# Patient Record
Sex: FEMALE | Race: WHITE | Employment: OTHER | URBAN - METROPOLITAN AREA
[De-identification: names, ages, dates, MRNs, and addresses within clinical notes are randomized per-mention and may not be internally consistent; named-entity substitution may affect disease eponyms.]

---

## 2017-02-28 ENCOUNTER — HOSPITAL ENCOUNTER (OUTPATIENT)
Dept: MAMMOGRAPHY | Age: 72
Discharge: HOME | End: 2017-02-28
Attending: INTERNAL MEDICINE
Payer: MEDICARE

## 2017-02-28 DIAGNOSIS — M89.9 BONE DISORDER: ICD-10-CM

## 2017-02-28 PROCEDURE — 77080 DXA BONE DENSITY AXIAL: CPT

## 2018-01-24 PROBLEM — M85.80 OSTEOPENIA DETERMINED BY X-RAY: Status: ACTIVE | Noted: 2018-01-24

## 2018-01-24 PROBLEM — E03.9 ACQUIRED HYPOTHYROIDISM: Status: ACTIVE | Noted: 2018-01-24

## 2018-01-24 PROBLEM — K57.30 DIVERTICULOSIS OF LARGE INTESTINE WITHOUT HEMORRHAGE: Status: ACTIVE | Noted: 2018-01-24

## 2019-01-03 ENCOUNTER — HOSPITAL ENCOUNTER (OUTPATIENT)
Dept: GENERAL RADIOLOGY | Age: 74
Discharge: HOME | End: 2019-01-03
Attending: NURSE PRACTITIONER
Payer: MEDICARE

## 2019-01-03 PROCEDURE — 71046 X-RAY EXAM CHEST 2 VIEWS: CPT

## 2019-04-01 ENCOUNTER — HOSPITAL ENCOUNTER (OUTPATIENT)
Dept: PHYSICAL THERAPY | Age: 74
Discharge: HOME | End: 2019-04-01
Attending: NURSE PRACTITIONER
Payer: MEDICARE

## 2019-04-01 PROCEDURE — 97140 MANUAL THERAPY 1/> REGIONS: CPT

## 2019-04-01 PROCEDURE — 97162 PT EVAL MOD COMPLEX 30 MIN: CPT

## 2019-04-01 PROCEDURE — 97035 APP MDLTY 1+ULTRASOUND EA 15: CPT

## 2019-04-01 NOTE — THERAPY EVALUATION
Johnson Nunu  : 1945  [place]  Secondary: [address] [facility] at [facility]  [address] 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 Domenica Bianchi is a 68 y.o. female presenting to physical therapy with complaints of R neck and shoulder pain with aches into her R arm and tingling into last two fingers in R hand in C8 pathway for one month since lifting a bag of dog food -pain level is 3/10 with mild headaches-history of bulging disk in cervical region per patient  -mild decreased  in R hand -UE range of motion is wfl -cervical range of motion is limited with pain in L  rotation and L sidebending with increased R cervical pain -very rounded shoulders and very tight B upper trapezius with mild rhomboid spasm -noted kyphosis at cervical thoracic junction-tight anterior /chest wall musculature with weak scapular musculature-very good candidate for skilled physical therapy to include manual therapy/cervical traction, pain modalities as needed and therapeutic exercises with proper postural training -motivated patient      PROBLEM LIST (Impacting functional limitations):  1. Decreased Strength  2. Decreased ADL/Functional Activities  3.  Increased Pain INTERVENTIONS PLANNED: (Treatment may consist of any combination of the following)  1. Cold  2. Electrical Stimulation  3. Heat  4. Home Exercise Program (HEP)  5. Manual Therapy  6. Range of Motion (ROM)  7. Therapeutic Exercise/Strengthening  8. Ultrasound (US)   TREATMENT PLAN:  Effective Dates: 4/1/2019 TO 5/17/2019 (45 days). Frequency/Duration: 2 times a week for 45 Days  GOALS: (Goals have been discussed and agreed upon with patient.)  Short-Term Functional Goals: Time Frame: 4/1/2019 to 5/17/19  1. Patient demonstrates independence with home exercise program without verbal cueing provided by therapist.  2. Neck and shoulder/upper humeral  Pain is less than 3/10 to progress to DC goal   3. Cervical range of motion in L rotation and L sidebending is increased to 75%  4. Decreased upper trapezius spasm by 10%  5. Less rounded shoulders via consistent exercise program  Instruction in exercise program to allow increased ADLs. Discharge Goals: Time Frame: 4/1/2019 to 5/17/19  1. Neck and shoulder pain is minimal at 0-1/10 to allow most home ADls including light over head lifting   2. No headaches  3. Cervical range of motion is wfl with minimal to no pain or stiffness  4. Minimal upper trapezius spasm with less rounded posture via consistent exercise program and breaking up forward/static positions   5. DASH score is improved from 27/55 to 14/55    Outcome Measure: Tool Used: Disabilities of the Arm, Shoulder and Hand (DASH) Questionnaire - Quick Version  Score:  Initial: 27/55  Most Recent: X/55 (Date: -- )   Interpretation of Score: The DASH is designed to measure the activities of daily living in person's with upper extremity dysfunction or pain. Each section is scored on a 1-5 scale, 5 representing the greatest disability. The scores of each section are added together for a total score of 55.         Medical Necessity:   · Patient is expected to demonstrate progress in strength, range of motion, balance, coordination and functional technique to increase independence with ADLs and improve safety during driving and over head activities. · Skilled intervention continues to be required due to R cervical and upper arm pain is affecting function. Reason for Services/Other Comments:  · Patient continues to require modification of therapeutic interventions to increase complexity of exercises. Total Evaluation Duration: 25 minutes    Rehabilitation Potential For Stated Goals: Good  Regarding Reeda Marker therapy, I certify that the treatment plan above will be carried out by a therapist or under their direction. Thank you for this referral,  Lela Miller PT     Referring Physician Signature: Didi Jean NP              Date                     PAIN/SUBJECTIVE:    Initial: Pain Intensity 1: 3  Pain Location 1: Neck, Shoulder, Arm  Pain Orientation 1: Upper, Lower  Pain Intervention(s) 1: Heat applied, Rest, Shower  Post Session:  2/10 -less spasm -relief with manual therapy     HISTORY:    History of Injury/Illness (Reason for Referral):  R cervical and upper arm pain with tingling into last two fingers of R hand since 3/1/19 after lifting a bag of dog food  Past Medical History/Comorbidities:   Ms. Lux Robbins  has a past medical history of Endometrial cancer ([facility] [address] Hypothyroidism, and Mitral valve prolapse. Ms. Lux Robbins  has a past surgical history that includes hx hysterectomy (2014) and hx tubal ligation (1982).   Social History/Living Environment:   Home Environment: Private residence  [place] to Enter: Yes  Wheelchair Ramp: No  Support Systems: Spouse/Significant Other/Partner  Patient Expects to be Discharged to[de-identified] Private residence  Current DME Used/Available at Home: None  Tub or Shower Type: Tub/Shower combination  Prior Level of Function/Work/Activity:  Independent with home ADLs including yard work   Dominant Side:         RIGHT    Ambulatory/Rehab Services H2 Model Falls Risk Assessment    Risk Factors:  Click here to Sprint 24 Quan to Rise from Chair:       (1)  Pushes up, successful in one attempt    Falls Prevention Plan:       No modifications necessary    Total: (5 or greater = High Risk): 1    ©2010 [facility] and [facility] Patent #[id number]. Federal Law prohibits the replication, distribution or use without written permission from [facility]    Current Medications:        Current Outpatient Medications:     fluticasone (FLONASE) 50 mcg/actuation nasal spray, 2 Sprays by Both Nostrils route daily. , Disp: 1 Bottle, Rfl: 1    albuterol (PROVENTIL HFA, VENTOLIN HFA, PROAIR HFA) 90 mcg/actuation inhaler, Take 2 Puffs by inhalation every four (4) hours as needed for Wheezing., Disp: 1 Inhaler, Rfl: 1    levoFLOXacin (LEVAQUIN) 500 mg tablet, Take 1 Tab by mouth daily. , Disp: 10 Tab, Rfl: 0    levothyroxine (SYNTHROID) 75 mcg tablet, Take 1 Tab by mouth Daily (before breakfast). BRAND NAME ONLY, Disp: 90 Tab, Rfl: 3    valACYclovir (VALTREX) 1 gram tablet, Take 1 Tab by mouth two (2) times daily as needed. , Disp: 30 Tab, Rfl: 0    varicella-zoster recombinant, PF, (SHINGRIX, PF,) 50 mcg/0.5 mL susr injection, IM: 0.5 mL administered as a 2-dose series at 0 and 2 to 6 months later, Disp: 0.5 mL, Rfl: 1    L. gasseri-B. bifidum-B longum 1.5 billion cell cap, Take  by mouth daily. , Disp: , Rfl:     fexofenadine (ALLEGRA) 180 mg tablet, Take 180 mg by mouth daily as needed for Allergies. , Disp: , Rfl:     Date Last Reviewed:  4/1/2019    Number of Personal Factors/Comorbidities that affect the Plan of Care-patient has a history of hypothyroidism, cancer, and cervical disc issues : 1-2: MODERATE COMPLEXITY    EXAMINATION:    Observation/Orthostatic Postural Assessment:          Noted rounded shoulders with forward head on neck posture and cervico thoracic kyphosis  Palpation:        Extremely tight bilateral upper trapezius but greater on the R side -tight pectoralis major from rounded posture   ROM: UE range of motion is wfl     Cervical motion is limited at 85% flexion/66% L rotation and 66% L sidebending with causing increased R cervical pain     Strength:         UE strength is wfl at 4/5 with  at 23 in dominant R hand and 24 in L hand   Special Tests:         Negative supraspinatus isolation test/negative lift off test for subscapularis/negative drop arm for rotator cuff tear/negative Obriens test for labrum issues -positive cervical distraction sign   Neurological Screen:    Tingling in last two fingers of R hand in C8 pathway     Mental Status:        Alert and oriented  Sensation:       Intact to light touch but tingling in ring finger and indgiiti minimi of R hand    Body Structures Involved:  1. Nerves  2. Bones  3. Joints  4. Muscles Body Functions Affected:  1. Sensory/Pain  2. Neuromusculoskeletal  3. Movement Related Activities and Participation Affected:  1. Learning and Applying Knowledge  2. General Tasks and Demands  3. Mobility  4. Self Care  5.  Domestic Life    Number of elements (examined above) that affect the Plan of Care: 3: MODERATE COMPLEXITY    CLINICAL PRESENTATION:    Presentation: Evolving clinical presentation with changing clinical characteristics: MODERATE COMPLEXITY    CLINICAL DECISION MAKING:    Use of outcome tool(s) and clinical judgement create a POC that gives a-outcome measure score illustrates up to 39% impairment: Questionable prediction of patient's progress: MODERATE COMPLEXITY

## 2019-04-01 NOTE — PROGRESS NOTES
Benjamin Epps  : 1945  [place]  Secondary: [facility]  [address] [place] [address], [place], [address] [place] [address]  [phone]   [phone]      OUTPATIENT PHYSICAL THERAPY: Daily Treatment Note 2019    Pre-treatment Symptoms/Complaints:  My R neck and shoulder have bothered me for a month after I lifted a bag of dog food    Pain: Initial: Pain Intensity 1: 3  Pain Location 1: Neck, Shoulder, Arm  Pain Orientation 1: Upper, Lower  Pain Intervention(s) 1: Heat applied, Rest, Shower  Post Session:  2/10-relief with manual therapy/traction -very tight bilateral upper trapezius with rounded posture    Medications Last Reviewed:  2019  REFERRING PHYSICIAN: Ginette Blackburn NP  RETURN PHYSICIAN APPOINTMENT: unsure    Precautions: overhead lifting and static forward/rounded cervical positions    Date of Onset: 3-1-19    Updated Objective Findings:  See evaluation note from today   TREATMENT:   THERAPEUTIC EXERCISE: (5 minutes):    Reviewed in exercises and hot/cold modalities      Exercises per grid below to improve mobility, strength, balance and coordination. Required minimal verbal cues to promote proper body alignment and promote proper body posture. Progressed resistance, range, repetitions and complexity of movement as indicated.      Date:  2019 Date:   Date:     Activity/Exercise Parameters Parameters Parameters   Cervical stretch      Rhomboid stretch      Pectoralis major/corner stretch      Backward shoulder shrugs      rows      Low rows      Scapular retractions          MANUAL THERAPY: (25 minutes): Joint mobilization and Soft tissue mobilization was utilized and necessary because of the patient's restricted joint motion and painful spasm   Soft tissue mobilization x 10 minutes to B upper trapezius /emphasizing R upper trapezius and R cervical paraspinals while seated-effleurage and petrisage for tightness   Manual cervical traction with mild mobilization and occipital release x 15 minutes while supine     MODALITIES: (10 minutes): *  Ultrasound was used today secondary to the patient having tightened structures limiting joint motion that require an increase in extensibility. Ultrasound was used today to reduce pain, reduce spasm, reduce joint stiffness and increase muscle flexibility. HEP: As above; handouts given to patient for all exercises. Treatment/Session Summary:    · Response to Treatment:  relief with manual therapy . · Communication/Consultation:  None today and push more extended posture -break up static computer/driving positions  · Equipment provided today:  None today and red theraband for home usage   · Recommendations/Intent for next treatment session: Next visit will focus on manual therapy to very tight B upper trapezius and cervical traction-stretch anterior/chest wall and strengthen scapular musculature .   Treatment Plan of Care Effective Dates: 4/1/2019 to 5/17/19    Total Treatment Billable Duration:  Manual therapy x 25 minutes/ultrasound x 10 minutes/evaluation x 25 minutes -total time was 60 minutes     Exercise review x 5 minutes       PT Patient Time In/Time Out  Time In: 1073  Time Out: 1640  Chava Valverde, PT

## 2019-04-03 ENCOUNTER — APPOINTMENT (OUTPATIENT)
Dept: PHYSICAL THERAPY | Age: 74
End: 2019-04-03

## 2019-04-05 ENCOUNTER — HOSPITAL ENCOUNTER (OUTPATIENT)
Dept: PHYSICAL THERAPY | Age: 74
Discharge: HOME | End: 2019-04-05
Attending: NURSE PRACTITIONER
Payer: MEDICARE

## 2019-04-05 PROCEDURE — 97035 APP MDLTY 1+ULTRASOUND EA 15: CPT

## 2019-04-05 PROCEDURE — 97140 MANUAL THERAPY 1/> REGIONS: CPT

## 2019-04-05 PROCEDURE — 97110 THERAPEUTIC EXERCISES: CPT

## 2019-04-05 NOTE — PROGRESS NOTES
Zelalem Mcdowell  : 1945  [place]  Secondary: [facility] at [address], [address]  [phone]   [phone]      OUTPATIENT PHYSICAL THERAPY: Daily Treatment Note 2019    Pre-treatment Symptoms/Complaints:  Patient reports feeling better after last session. C/O intermittent radiating pain into R UE and headache but overall some improvement. Pain: Initial: Pain Intensity 1: 3  Pain Location 1: Neck  Pain Orientation 1: Upper, Lower  Post Session: 1/10-    Medications Last Reviewed:  2019  REFERRING PHYSICIAN: Shayan Trinidad NP  RETURN PHYSICIAN APPOINTMENT: unsure    Precautions: overhead lifting and static forward/rounded cervical positions    Date of Onset: 3-1-19    Updated Objective Findings:  See evaluation note from today   TREATMENT:   THERAPEUTIC EXERCISE: (15 minutes):    Reviewed in exercises and hot/cold modalities      Exercises per grid below to improve mobility, strength, balance and coordination. Required minimal verbal cues to promote proper body alignment and promote proper body posture. Progressed resistance, range, repetitions and complexity of movement as indicated. Date:  2019 Date:  19 Date:     Activity/Exercise Parameters Parameters Parameters   Cervical stretch  X 3    Rhomboid stretch  X 3    Pectoralis major/corner stretch  X 2    Backward shoulder shrugs      rows  Green 1 x 10    Low rows  Red 1 x 10    Scapular retractions  1 x 10        MANUAL THERAPY: (25 minutes): Joint mobilization and Soft tissue mobilization was utilized and necessary because of the patient's restricted joint motion and painful spasm   Soft tissue mobilization to B upper trapezius /emphasizing R upper trapezius and R cervical paraspinals while seated-effleurage,acupressure and petrissage for tightness        MODALITIES: (15 minutes):       *  Ultrasound was used today secondary to the patient having tightened structures limiting joint motion that require an increase in extensibility. Ultrasound was used today to reduce pain, reduce spasm, reduce joint stiffness and increase muscle flexibility. 1.3 duffy/cm2 in sitting    HEP: As above; handouts given to patient for all exercises. Treatment/Session Summary:    · Response to Treatment:  relief with manual therapy . · Communication/Consultation:  extensive education in exercise technique  · Equipment provided today:  None today  · Recommendations/Intent for next treatment session: Next visit will focus on manual therapy to very tight B upper trapezius and cervical traction-stretch anterior/chest wall and strengthen scapular musculature .   Treatment Plan of Care Effective Dates: 4/1/2019 to 5/17/19    Total Treatment Billable Duration:  55 minutes      PT Patient Time In/Time Out  Time In: 1430  Time Out: 1530  Guerrero Xie PTA

## 2019-04-08 ENCOUNTER — HOSPITAL ENCOUNTER (OUTPATIENT)
Dept: PHYSICAL THERAPY | Age: 74
Discharge: HOME | End: 2019-04-08
Attending: NURSE PRACTITIONER
Payer: MEDICARE

## 2019-04-08 PROCEDURE — 97140 MANUAL THERAPY 1/> REGIONS: CPT

## 2019-04-08 PROCEDURE — 97110 THERAPEUTIC EXERCISES: CPT

## 2019-04-08 PROCEDURE — 97035 APP MDLTY 1+ULTRASOUND EA 15: CPT

## 2019-04-08 NOTE — PROGRESS NOTES
Addy Mohs  : 1945  [place]  [facility]: [facility]  [address] [place] [address], [place], [address] [place] [address]  [phone]   [phone]      OUTPATIENT PHYSICAL THERAPY: Daily Treatment Note 2019    Pre-treatment Symptoms/Complaints:  Patient reports feeling better an dis trying to watch her posture -no numbness/tingling into fingers today    Pain: Initial: Pain Intensity 1: 2  Pain Location 1: Neck  Pain Orientation 1: Lower, Right  Pain Intervention(s) 1: Cold pack, Exercise, Heat applied  Post Session: 1/10-    Medications Last Reviewed:  2019  REFERRING PHYSICIAN: Nate Brandon NP  RETURN PHYSICIAN APPOINTMENT: unsure    Precautions: overhead lifting and static forward/rounded cervical positions    Date of Onset: 3-1-19    Updated Objective Findings: decreased cervical spasm -more erect posture-decreased radicular symptoms into R hand   TREATMENT:   THERAPEUTIC EXERCISE: (20 minutes):    Reviewed in exercises and hot/cold modalities      Exercises per grid below to improve mobility, strength, balance and coordination. Required minimal verbal cues to promote proper body alignment and promote proper body posture. Progressed resistance, range, repetitions and complexity of movement as indicated.      Date:  2019 Date:  19 Date:  19   Activity/Exercise Parameters Parameters Parameters   Cervical stretch  X 3 4 x 30 seconds   Rhomboid stretch  X 3 2 x 30 seconds   Pectoralis major/corner stretch  X 2 2 x 30 seconds   Backward shoulder shrugs   2 x 10 with red band   rows  Green 1 x 10 2 x 10 with green band   Low rows  Red 1 x 10 2 x 10 with green band    Reverse wall pushups   2 x 10   Scapular retractions  1 x 10 2 x 10 with green band       MANUAL THERAPY: (25 minutes): Joint mobilization and Soft tissue mobilization was utilized and necessary because of the patient's restricted joint motion and painful spasm   Soft tissue mobilization to B upper trapezius /emphasizing R upper trapezius and R cervical paraspinals while seated-effleurage,acupressure and petrissage for tightness    manual cervical traction with occipital release while supine     MODALITIES: (10 minutes): *  Ultrasound was used today secondary to the patient having tightened structures limiting joint motion that require an increase in extensibility. Ultrasound was used today to reduce pain, reduce spasm, reduce joint stiffness and increase muscle flexibility. 1.5 duffy/cm2 in sitting to B cervicals and upper  trapezius    HEP: As above; handouts given to patient for all exercises. Treatment/Session Summary:    · Response to Treatment:  relief with manual therapy .-less pain and less rounded posture  · Communication/Consultation:  extensive education in exercise technique  · Equipment provided today:  None today  · Recommendations/Intent for next treatment session: Next visit will focus on manual therapy to very tight B upper trapezius and cervical traction-stretch anterior/chest wall and strengthen scapular musculature and push more extended posture .   Treatment Plan of Care Effective Dates: 4/1/2019 to 5/17/19    Total Treatment Billable Duration:  55 minutes      PT Patient Time In/Time Out  Time In: 1430  Time Out: 215 [facility] PT

## 2019-04-11 ENCOUNTER — HOSPITAL ENCOUNTER (OUTPATIENT)
Dept: PHYSICAL THERAPY | Age: 74
Discharge: HOME | End: 2019-04-11
Attending: NURSE PRACTITIONER
Payer: MEDICARE

## 2019-04-11 PROCEDURE — 97110 THERAPEUTIC EXERCISES: CPT

## 2019-04-11 PROCEDURE — 97140 MANUAL THERAPY 1/> REGIONS: CPT

## 2019-04-11 PROCEDURE — 97035 APP MDLTY 1+ULTRASOUND EA 15: CPT

## 2019-04-11 NOTE — PROGRESS NOTES
Marshall Ferreira  : 1945  [place]  Secondary: [facility]  [address] [place] [address], [place], [address] [place] [address]  [phone]   [phone]      OUTPATIENT PHYSICAL THERAPY: Daily Treatment Note 2019    Pre-treatment Symptoms/Complaints:  Patient reports intermittent radiating pain into right upper extremity but overall improving since starting therapy    Pain: Initial: Pain Intensity 1: 4  Pain Location 1: Neck  Pain Orientation 1: Right  Post Session: 1/10-    Medications Last Reviewed:  2019  REFERRING PHYSICIAN: Montserrat Pruitt NP  RETURN PHYSICIAN APPOINTMENT: unsure    Precautions: overhead lifting and static forward/rounded cervical positions    Date of Onset: 3-1-19    Updated Objective Findings: decreased cervical spasm -more erect posture-decreased radicular symptoms into R hand   TREATMENT:   THERAPEUTIC EXERCISE: (15 minutes):    Reviewed in exercises and hot/cold modalities      Exercises per grid below to improve mobility, strength, balance and coordination. Required minimal verbal cues to promote proper body alignment and promote proper body posture. Progressed resistance, range, repetitions and complexity of movement as indicated.      Date:  2019 Date:  19 Date:  19   Activity/Exercise Parameters Parameters Parameters   Cervical stretch reviewed X 3 4 x 30 seconds   Rhomboid stretch  X 3 2 x 30 seconds   Pectoralis major/corner stretch 4 x 20 sec X 2 2 x 30 seconds   Backward shoulder shrugs   2 x 10 with red band   rows Red 2 x 10 Green 1 x 10 2 x 10 with green band   Low rows Red 2 x 10 Red 1 x 10 2 x 10 with green band    Reverse wall pushups   2 x 10   Scapular retractions 1 x 10 1 x 10 2 x 10 with green band       MANUAL THERAPY: (30 minutes): Joint mobilization and Soft tissue mobilization was utilized and necessary because of the patient's restricted joint motion and painful spasm   Soft tissue mobilization to B upper trapezius /emphasizing R upper trapezius and R cervical paraspinals while seated-effleurage,acupressure and petrissage for tightness    manual cervical traction with occipital release while supine     MODALITIES: (10 minutes): *  Ultrasound was used today secondary to the patient having tightened structures limiting joint motion that require an increase in extensibility. Ultrasound was used today to reduce pain, reduce spasm, reduce joint stiffness and increase muscle flexibility. 1.5 duffy/cm2 in sitting to B cervicals and upper  trapezius    HEP: As above; handouts given to patient for all exercises. Treatment/Session Summary:    · Response to Treatment:  Decreased pain after session. good relief with traction. -  · Communication/Consultation:  None today  · Equipment provided today:  None today  · Recommendations/Intent for next treatment session: Next visit will focus on manual therapy and there ex .   Treatment Plan of Care Effective Dates: 4/1/2019 to 5/17/19    Total Treatment Billable Duration:  55 minutes      PT Patient Time In/Time Out  Time In: 1100  Time Out: 1200  Carlyn Meigs, PTA

## 2019-04-15 ENCOUNTER — HOSPITAL ENCOUNTER (OUTPATIENT)
Dept: PHYSICAL THERAPY | Age: 74
Discharge: HOME | End: 2019-04-15
Attending: NURSE PRACTITIONER
Payer: MEDICARE

## 2019-04-15 PROCEDURE — 97110 THERAPEUTIC EXERCISES: CPT

## 2019-04-15 PROCEDURE — 97035 APP MDLTY 1+ULTRASOUND EA 15: CPT

## 2019-04-15 PROCEDURE — 97140 MANUAL THERAPY 1/> REGIONS: CPT

## 2019-04-15 NOTE — PROGRESS NOTES
Dewight Cushing  : 1945  [place]  Secondary: [facility] at [address], [address] [place] [address]  [phone]   [phone]      OUTPATIENT PHYSICAL THERAPY: Daily Treatment Note 4/15/2019    Pre-treatment Symptoms/Complaints:  Patient reports no radicular symptoms into R hand but main ache is in anterior R shoulder -\"I think the cool wet weather that came in over the weekend may have caused me to be sore or it may be me holding my cat on that shoulder \"    Pain: Initial: Pain Intensity 1: 3  Pain Location 1: Neck, Shoulder  Pain Orientation 1: Anterior, Right  Pain Intervention(s) 1: Cold pack, Exercise, Heat applied, Massage  Post Session: 1/10- mild tightness in R upper trapezius -full pain free R shoulder motion   Medications Last Reviewed:  4/15/2019  REFERRING PHYSICIAN: Jaiden Sanz NP  RETURN PHYSICIAN APPOINTMENT: unsure    Precautions: overhead lifting and static forward/rounded cervical positions    Date of Onset: 3-1-19    Updated Objective Findings: decreased cervical spasm -more erect posture-decreased radicular symptoms into R hand-sore in anterior R shoulder but full/pain free mobility    TREATMENT:   THERAPEUTIC EXERCISE: (18 minutes):    Reviewed in exercises and hot/cold modalities      Exercises per grid below to improve mobility, strength, balance and coordination. Required minimal verbal cues to promote proper body alignment and promote proper body posture. Progressed resistance, range, repetitions and complexity of movement as indicated.      Date:  2019 Date:  4-15-19 Date:  19   Activity/Exercise Parameters Parameters Parameters   Cervical stretch reviewed 3 x 30 seconds on right  4 x 30 seconds   Rhomboid stretch  2 x 30 seconds on right  2 x 30 seconds   Pectoralis major/corner stretch 4 x 20 sec 4 x 30 seconds with slow release  2 x 30 seconds   Backward shoulder shrugs  2 x 10 with green band 2 x 10 with red band   rows Red 2 x 10 2 x 10 with green band 2 x 10 with green band   Low rows Red 2 x 10 2 x 10 with green band 2 x 10 with green band    Reverse wall pushups  2 x 10 2 x 10   Scapular retractions 1 x 10 2 x 10 with green band 2 x 10 with green band       MANUAL THERAPY: (30 minutes): Joint mobilization and Soft tissue mobilization was utilized and necessary because of the patient's restricted joint motion and painful spasm   Soft tissue mobilization to B upper trapezius /emphasizing R upper trapezius and R cervical paraspinals while seated-effleurage,acupressure and petrissage for tightness    manual cervical traction with occipital release while supine     MODALITIES: (10 minutes): *  Ultrasound was used today secondary to the patient having tightened structures limiting joint motion that require an increase in extensibility. Ultrasound was used today to reduce pain, reduce spasm, reduce joint stiffness and increase muscle flexibility. 1.5 duffy/cm2 in sitting to R cervicals and upper  trapezius    Cold pack x 8 minutes to cervicals and R deltoid while supine after therapy     HEP: As above; handouts given to patient for all exercises. Treatment/Session Summary:    · Response to Treatment:  Decreased pain after session. good relief with traction.-may extend ultrasound to R deltoid   · Communication/Consultation:  None today  · Equipment provided today:  None today  · Recommendations/Intent for next treatment session: Next visit will focus on manual therapy and there ex  And more extended posture.   Treatment Plan of Care Effective Dates: 4/1/2019 to 5/17/19    Total Treatment Billable Duration:  66 minutes      PT Patient Time In/Time Out  Time In: 1430  Time Out: 1538  Jessy Platt PT

## 2019-04-19 ENCOUNTER — HOSPITAL ENCOUNTER (OUTPATIENT)
Dept: PHYSICAL THERAPY | Age: 74
Discharge: HOME | End: 2019-04-19
Attending: NURSE PRACTITIONER
Payer: MEDICARE

## 2019-04-19 PROCEDURE — 97035 APP MDLTY 1+ULTRASOUND EA 15: CPT

## 2019-04-19 PROCEDURE — 97110 THERAPEUTIC EXERCISES: CPT

## 2019-04-19 PROCEDURE — 97140 MANUAL THERAPY 1/> REGIONS: CPT

## 2019-04-19 NOTE — PROGRESS NOTES
Boubacar Schuster  : 1945  [place]  Secondary: [facility]  [address] [place] [address], [place], [address] [place] [address]  [phone]   [phone]      OUTPATIENT PHYSICAL THERAPY: Daily Treatment Note 2019    Pre-treatment Symptoms/Complaints:  Patient reports arm and shoulder pain is her main C/O today. Continues to wake up during the night with right arm pain. Feels better after session. Pain: Initial: Pain Intensity 1: 3  Pain Location 1: Neck, Shoulder, Arm  Pain Orientation 1: Right  Post Session: 1/10-   Medications Last Reviewed:  2019  REFERRING PHYSICIAN: Trenton Lowery NP  RETURN PHYSICIAN APPOINTMENT: unsure    Precautions: overhead lifting and static forward/rounded cervical positions    Date of Onset: 3-1-19    Updated Objective Findings: Increased pain and tenderness teres and lateral arm. TREATMENT:   THERAPEUTIC EXERCISE: (10 minutes):      Exercises per grid below to improve mobility, strength, balance and coordination. Required minimal verbal cues to promote proper body alignment and promote proper body posture. Progressed resistance, range, repetitions and complexity of movement as indicated.      Date:  2019 Date:  4-15-19 Date:  19   Activity/Exercise Parameters Parameters Parameters   Cervical stretch reviewed 3 x 30 seconds on right  3 x 30 seconds   Rhomboid stretch  2 x 30 seconds on right     Pectoralis major/corner stretch 4 x 20 sec 4 x 30 seconds with slow release  5 x 10 sec   Backward shoulder shrugs  2 x 10 with green band    rows Red 2 x 10 2 x 10 with green band Red 3 x 10   Low rows Red 2 x 10 2 x 10 with green band Yellow 3 x 10   Reverse wall pushups  2 x 10    Scapular retractions 1 x 10 2 x 10 with green band Multiple reps       MANUAL THERAPY: (35 minutes): Joint mobilization and Soft tissue mobilization was utilized and necessary because of the patient's restricted joint motion and painful spasm   Soft tissue mobilization to B upper trapezius /emphasizing R upper trapezius teres and R cervical paraspinals while seated-effleurage,acupressure and petrissage for tightness    manual cervical traction with occipital release while supine     MODALITIES: (10 minutes): *  Ultrasound was used today secondary to the patient having tightened structures limiting joint motion that require an increase in extensibility. Ultrasound was used today to reduce pain, reduce spasm, reduce joint stiffness and increase muscle flexibility. 1.5 duffy/cm2 in sitting to R cervicals and upper  trapezius      HEP: As above; handouts given to patient for all exercises. Treatment/Session Summary:    · Response to Treatment:  Decreased pain after session. .-  · Communication/Consultation:  educated ti proper scapular positioning and posture. · Equipment provided today:  None today  · Recommendations/Intent for next treatment session: Next visit will focus on manual therapy and there ex .   Treatment Plan of Care Effective Dates: 4/1/2019 to 5/17/19    Total Treatment Billable Duration:  55 minutes      PT Patient Time In/Time Out  Time In: 1340  Time Out: Ingr[address] 22, PTA

## 2019-04-22 ENCOUNTER — HOSPITAL ENCOUNTER (OUTPATIENT)
Dept: PHYSICAL THERAPY | Age: 74
Discharge: HOME | End: 2019-04-22
Attending: NURSE PRACTITIONER
Payer: MEDICARE

## 2019-04-22 PROCEDURE — 97140 MANUAL THERAPY 1/> REGIONS: CPT

## 2019-04-22 PROCEDURE — 97110 THERAPEUTIC EXERCISES: CPT

## 2019-04-22 PROCEDURE — 97035 APP MDLTY 1+ULTRASOUND EA 15: CPT

## 2019-04-22 NOTE — PROGRESS NOTES
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 Ramila Blair is a 68 y.o. female presenting to physical therapy with complaints of R neck and shoulder pain with aches into her R arm and tingling into last two fingers in R hand in C8 pathway for one month since lifting a bag of dog food -pain level is 3/10 with mild headaches-history of bulging disk in cervical region per patient  -mild decreased  in R hand -UE range of motion is wfl -cervical range of motion is limited with pain in L  rotation and L sidebending with increased R cervical pain -very rounded shoulders and very tight B upper trapezius with mild rhomboid spasm -noted kyphosis at cervical thoracic junction-tight anterior /chest wall musculature with weak scapular musculature-very good candidate for skilled physical therapy to include manual therapy/cervical traction, pain modalities as needed and therapeutic exercises with proper postural training -motivated patient     Patient has been seen for 7 visits of R cervical and R shoulder rehab from 19 to 19 with  good progress-cervical pain is minimal at 1/10 with minimal to no headaches -cervical motion has improved to wfl-less rounded posture with less forward head on neck posture -shoulder range of motion is wfl but increased shoulder pain with Obriens test for R labrum issues -UE range of motion is wfl -independent with home exercise program to neck and we have added R shoulder conditioning program as well on 4-22-19-NDI score has improved from 27/55 to 9/55-motivated patient -pleasant lady to work with-continue cervical and shoulder rehab      PROBLEM LIST (Impacting functional limitations):  1. Decreased Strength  2. Decreased ADL/Functional Activities  3. Increased Pain INTERVENTIONS PLANNED: (Treatment may consist of any combination of the following)  1. Cold  2. Electrical Stimulation  3. Heat  4. Home Exercise Program (HEP)  5. Manual Therapy  6. Range of Motion (ROM)  7. Therapeutic Exercise/Strengthening  8. Ultrasound (US)   TREATMENT PLAN:  Effective Dates: 4/1/2019 TO 5/17/2019 (45 days). Frequency/Duration: 2 times a week for 45 Days  GOALS: (Goals have been discussed and agreed upon with patient.)  Short-Term Functional Goals: Time Frame: 4/1/2019 to 5/17/19  1. Patient demonstrates independence with home exercise program without verbal cueing provided by therapist.-GOAL MET  2. Neck and shoulder/upper humeral  Pain is less than 3/10 to progress to DC goal -GOAL MET-LATEST SCORE IS 2/10  3. Cervical range of motion in L rotation and L sidebending is increased to 75%-GOAL MET  4. Decreased upper trapezius spasm by 10%-GOAL MET  5. Less rounded shoulders via consistent exercise program-GOAL MET  Instruction in exercise program to allow increased ADLs. -GOAL MET  Discharge Goals: Time Frame: 4/1/2019 to 5/17/19  1. Neck and shoulder pain is minimal at 0-1/10 to allow most home ADls including light over head lifting -ONGOING  2. No headaches-MINIMAL SINUS HEADCHES  3. Cervical range of motion is wfl with minimal to no pain or stiffness-ONGOING  4.  Minimal upper trapezius spasm with less rounded posture via consistent exercise program and breaking up forward/static positions -ONGOING  5. DASH score is improved from 27/55 to 14/55-GOAL MET-LATEST SCORE IS 9/55    Outcome Measure: Tool Used: Disabilities of the Arm, Shoulder and Hand (DASH) Questionnaire - Quick Version  Score:  Initial: 27/55  Most Recent: 9/55 (Date:4-22-19 -- )   Interpretation of Score: The DASH is designed to measure the activities of daily living in person's with upper extremity dysfunction or pain. Each section is scored on a 1-5 scale, 5 representing the greatest disability. The scores of each section are added together for a total score of 55. Medical Necessity:   · Patient is expected to demonstrate progress in strength, range of motion, balance, coordination and functional technique to increase independence with ADLs and improve safety during driving and over head activities. · Skilled intervention continues to be required due to R cervical and upper arm pain is affecting function. Reason for Services/Other Comments:  · Patient continues to require modification of therapeutic interventions to increase complexity of exercises. Total Evaluation Duration: 25 minutes    Rehabilitation Potential For Stated Goals: Good  Regarding Salma Salamanca therapy, I certify that the treatment plan above will be carried out by a therapist or under their direction.   Thank you for this referral,  Elissa Bustamante PT     Referring Physician Signature: Feliciano Whittington NP              Date                     PAIN/SUBJECTIVE:    Initial: Pain Intensity 1: 3  Pain Location 1: Shoulder  Pain Orientation 1: Lateral, Upper, Right  Pain Intervention(s) 1: Cold pack, Exercise, Heat applied, Massage  Post Session:  2/10 -less spasm -relief with manual therapy     HISTORY:    History of Injury/Illness (Reason for Referral):  R cervical and upper arm pain with tingling into last two fingers of R hand since 3/1/19 after lifting a bag of dog food  Past Medical History/Comorbidities:   Ms. Collins Frias  has a past medical history of Endometrial cancer ([address] Hypothyroidism, and Mitral valve prolapse. Ms. Collins Frias  has a past surgical history that includes hx hysterectomy (2014) and hx tubal ligation (1982). Social History/Living Environment:      Prior Level of Function/Work/Activity:  Independent with home ADLs including yard work   Dominant Side:         RIGHT    Ambulatory/Rehab [facility] Risk Assessment    Risk Factors:  Click here to [employer] Ability to Rise from Chair:       (1)  Pushes up, successful in one attempt    [address]:       No modifications necessary    Total: (5 or greater = High Risk): 1    ©2010 [employer] [facility] [employer] [address] [place] [address] States Patent #[id number]. Federal Law prohibits the replication, distribution or use without written permission from [facility] [employer]    Current Medications:        Current Outpatient Medications:     fluticasone (FLONASE) 50 mcg/actuation nasal spray, 2 Sprays by Both Nostrils route daily. , Disp: 1 Bottle, Rfl: 1    albuterol (PROVENTIL HFA, VENTOLIN HFA, PROAIR HFA) 90 mcg/actuation inhaler, Take 2 Puffs by inhalation every four (4) hours as needed for Wheezing., Disp: 1 Inhaler, Rfl: 1    levoFLOXacin (LEVAQUIN) 500 mg tablet, Take 1 Tab by mouth daily. , Disp: 10 Tab, Rfl: 0    levothyroxine (SYNTHROID) 75 mcg tablet, Take 1 Tab by mouth Daily (before breakfast). BRAND NAME ONLY, Disp: 90 Tab, Rfl: 3    valACYclovir (VALTREX) 1 gram tablet, Take 1 Tab by mouth two (2) times daily as needed. , Disp: 30 Tab, Rfl: 0    varicella-zoster recombinant, PF, (SHINGRIX, PF,) 50 mcg/0.5 mL susr injection, IM: 0.5 mL administered as a 2-dose series at 0 and 2 to 6 months later, Disp: 0.5 mL, Rfl: 1    L. gasseri-B. bifidum-B longum 1.5 billion cell cap, Take  by mouth daily. , Disp: , Rfl:     fexofenadine (ALLEGRA) 180 mg tablet, Take 180 mg by mouth daily as needed for Allergies. , Disp: , Rfl:     Date Last Reviewed:  4/22/2019    Number of Personal Factors/Comorbidities that affect the Plan of Care-patient has a history of hypothyroidism, cancer, and cervical disc issues : 1-2: MODERATE COMPLEXITY    EXAMINATION:    Observation/Orthostatic Postural Assessment:          Noted rounded shoulders with forward head on neck posture and cervico thoracic kyphosis  Palpation:       Less tight bilateral upper trapezius but greater on the R side -less tight pectoralis major from rounded posture via stretches   ROM:          UE range of motion is wfl     Cervical motion is limited at 90% flexion/75% L rotation and 75% L sidebending with less stretching pain      Strength:         UE strength is wfl at 4/5 with  at 23 in dominant R hand and 24 in L hand   Special Tests:         Negative supraspinatus isolation test/negative lift off test for subscapularis/negative drop arm for rotator cuff tear/    Positive negative Obriens test for labrum issues -    positive cervical distraction sign   Neurological Screen:    Tingling in last two fingers of R hand in C8 pathway have resolved     Mental Status:        Alert and oriented  Sensation:       Intact to light touch but tingling in ring finger and indgiiti minimi of R hand    Body Structures Involved:  1. Nerves  2. Bones  3. Joints  4. Muscles Body Functions Affected:  1. Sensory/Pain  2. Neuromusculoskeletal  3. Movement Related Activities and Participation Affected:  1. Learning and Applying Knowledge  2. General Tasks and Demands  3. Mobility  4. Self Care  5.  Domestic Life    Number of elements (examined above) that affect the Plan of Care: 3: MODERATE COMPLEXITY    CLINICAL PRESENTATION:    Presentation: Evolving clinical presentation with changing clinical characteristics: MODERATE COMPLEXITY    CLINICAL DECISION MAKING:    Use of outcome tool(s) and clinical judgement create a POC that gives a-outcome measure score illustrates up to 39% impairment: Questionable prediction of patient's progress: MODERATE COMPLEXITY

## 2019-04-22 NOTE — PROGRESS NOTES
Alex Nunu  : 1945  [place]  Secondary: [facility] at [address], [place], [address] [place] [address]  [phone]   [phone]      OUTPATIENT PHYSICAL THERAPY: Daily Treatment Note 2019    Pre-treatment Symptoms/Complaints:  Patient reports her neck is feeling better but feels she may have strained her R shoulder over the busy holiday weekend    Pain: Initial: Pain Intensity 1: 3  Pain Location 1: Shoulder  Pain Orientation 1: Lateral, Upper, Right  Pain Intervention(s) 1: Cold pack, Exercise, Heat applied, Massage  Post Session: 1/10-emphasize treatment to R deltoid    Medications Last Reviewed:  2019  REFERRING PHYSICIAN: Jaclyn Tom NP  RETURN PHYSICIAN APPOINTMENT: unsure    Precautions: overhead lifting and static forward/rounded cervical positions    Date of Onset: 3-1-19    Updated Objective Findings: Increased pain and tenderness teres and lateral arm/positve sign for R shoulder labrum issues -improved neck mobility and posture. TREATMENT:   THERAPEUTIC EXERCISE: (30 minutes):      Exercises per grid below to improve mobility, strength, balance and coordination. Required minimal verbal cues to promote proper body alignment and promote proper body posture. Progressed resistance, range, repetitions and complexity of movement as indicated.      Date:  19 Date:  4-15-19 Date:  19   Activity/Exercise Parameters Parameters Parameters   Cervical stretch 2 x 30 seconds  3 x 30 seconds on right  3 x 30 seconds   Rhomboid stretch 2 x 30 seconds 2 x 30 seconds on right     Pectoralis major/corner stretch 4 x 30 seconds in doorway 4 x 30 seconds with slow release  5 x 10 sec   Backward shoulder shrugs 2 x 10 with red band 2 x 10 with green band    rows 2 x 10 with green band 2 x 10 with green band Red 3 x 10   Low rows 2 x 10 with green band 2 x 10 with green band Yellow 3 x 10   Shoulder flexion 2 x 10 with yellow band     Shoulder extension 2 x 10 with yellow band     Shoulder int rot 2 x 10 with yellow band     Shoulder ext rot  2 x 10 with yellow band     Reverse wall pushups  2 x 10    Scapular retractions 2 x 10 with yellow band  2 x 10 with green band Multiple reps       MANUAL THERAPY: (15 minutes): Joint mobilization and Soft tissue mobilization was utilized and necessary because of the patient's restricted joint motion and painful spasm   Soft tissue mobilization to R deltoid and R  upper trapezius  while seated-effleurage,acupressure and petrissage for tightness       MODALITIES: (10 minutes): *  Ultrasound was used today secondary to the patient having tightened structures limiting joint motion that require an increase in extensibility. Ultrasound was used today to reduce pain, reduce spasm, reduce joint stiffness and increase muscle flexibility. 1.5 duffy/cm2 in sitting to R deltoid  and upper  trapezius      HEP: As above; handouts given to patient for all exercises. Treatment/Session Summary:    · Response to Treatment:  Decreased pain after session. .-decreased shoulder soreness  · Communication/Consultation:  educated ti proper scapular positioning and posture. · Equipment provided today:  None today  · Recommendations/Intent for next treatment session: Next visit will focus on manual therapy and there ex to R deltoid -monitor posture .   Treatment Plan of Care Effective Dates: 4/1/2019 to 5/17/19    Total Treatment Billable Duration:  55 minutes      PT Patient Time In/Time Out  Time In: 1430  Time Out: [facility]  Chava Valverde, SANIA

## 2019-04-25 ENCOUNTER — HOSPITAL ENCOUNTER (OUTPATIENT)
Dept: PHYSICAL THERAPY | Age: 74
Discharge: HOME | End: 2019-04-25
Attending: NURSE PRACTITIONER
Payer: MEDICARE

## 2019-04-25 PROCEDURE — 97035 APP MDLTY 1+ULTRASOUND EA 15: CPT

## 2019-04-25 PROCEDURE — 97110 THERAPEUTIC EXERCISES: CPT

## 2019-04-25 PROCEDURE — 97140 MANUAL THERAPY 1/> REGIONS: CPT

## 2019-04-25 NOTE — PROGRESS NOTES
Chivo Jack  : 1945  [place]  [place]: [place] [facility] at [address], [address]  [phone]   [phone]      OUTPATIENT PHYSICAL THERAPY: Daily Treatment Note 2019    Pre-treatment Symptoms/Complaints:  Patient reports increased headache,shoulder and arm pain today. Pain: Initial: Pain Intensity 1: 3  Pain Location 1: Arm, Neck, Shoulder  Pain Orientation 1: Right  Post Session: 1/10-   Medications Last Reviewed:  2019  REFERRING PHYSICIAN: Rigo Padilla NP  RETURN PHYSICIAN APPOINTMENT: unsure    Precautions: overhead lifting and static forward/rounded cervical positions    Date of Onset: 3-1-19    Updated Objective Findings: Increased pain and tenderness teres and lateral arm/positve sign for R shoulder labrum issues -improved neck mobility and posture. TREATMENT:   THERAPEUTIC EXERCISE: (25 minutes):      Exercises per grid below to improve mobility, strength, balance and coordination. Required minimal verbal cues to promote proper body alignment and promote proper body posture. Progressed resistance, range, repetitions and complexity of movement as indicated.      Date:  19 Date:  19 Date:  19   Activity/Exercise Parameters Parameters Parameters   Cervical stretch 2 x 30 seconds   3 x 30 seconds   Rhomboid stretch 2 x 30 seconds     Pectoralis major/corner stretch 4 x 30 seconds in doorway  5 x 10 sec   Backward shoulder shrugs 2 x 10 with red band 2 x 10    rows 2 x 10 with green band Red 3 x 10 Red 3 x 10   Low rows 2 x 10 with green band Red 3 x 10 Yellow 3 x 10   Shoulder flexion 2 x 10 with yellow band Yellow 1 x 10    Shoulder extension 2 x 10 with yellow band     Shoulder int rot 2 x 10 with yellow band Yellow 2 x 10    Shoulder ext rot  2 x 10 with yellow band Yellow 2 x 10    Reverse wall pushups      Scapular retractions 2 x 10 with yellow band  1 x 10 Multiple reps   Cervical rotation  5 x 5 sec    Lateral flexion  5 x 5 sec    chintucks  5 x 5 sec        MANUAL THERAPY: (20 minutes): Joint mobilization and Soft tissue mobilization was utilized and necessary because of the patient's restricted joint motion and painful spasm   Soft tissue mobilization to R deltoid and R  upper trapezius  while seated-effleurage,acupressure and petrissage for tightness       MODALITIES: (12 minutes): *  Ultrasound was used today secondary to the patient having tightened structures limiting joint motion that require an increase in extensibility. Ultrasound was used today to reduce pain, reduce spasm, reduce joint stiffness and increase muscle flexibility. 1.5 duffy/cm2 in sitting to R deltoid  and upper  trapezius      HEP: As above; handouts given to patient for all exercises. Treatment/Session Summary:    · Response to Treatment:  Decreased pain after session. .-decreased headache  · Communication/Consultation:  None today  · Equipment provided today:  None today  · Recommendations/Intent for next treatment session: Next visit will focus on manual therapy and there ex to R deltoid -  Treatment Plan of Care Effective Dates: 4/1/2019 to 5/17/19    Total Treatment Billable Duration:  57 minutes      PT Patient Time In/Time Out  Time In: 1430  Time Out: 1530  Jose North, JIMMY

## 2019-04-29 ENCOUNTER — HOSPITAL ENCOUNTER (OUTPATIENT)
Dept: PHYSICAL THERAPY | Age: 74
Discharge: HOME | End: 2019-04-29
Attending: NURSE PRACTITIONER
Payer: MEDICARE

## 2019-04-29 PROCEDURE — 97140 MANUAL THERAPY 1/> REGIONS: CPT

## 2019-04-29 PROCEDURE — 97110 THERAPEUTIC EXERCISES: CPT

## 2019-04-29 NOTE — PROGRESS NOTES
Eliane Cutting  : 1945  [place]  [place]: [place] [address] at [facility]  [address] [place] [address], [address]  [phone]   [phone]      OUTPATIENT PHYSICAL THERAPY: Daily Treatment Note 2019    Pre-treatment Symptoms/Complaints:  Patient reports minimal neck and shoulder pain with no headaches . Pain: Initial: Pain Intensity 1: 2  Pain Location 1: Neck, Shoulder  Pain Orientation 1: Right  Pain Intervention(s) 1: Exercise  Post Session: 1/10-decreased R upper trapezius spasm and improved posture -less rounded   Medications Last Reviewed:  2019  REFERRING PHYSICIAN: Sofiya Castellanos NP  RETURN PHYSICIAN APPOINTMENT: unsure    Precautions: overhead lifting and static forward/rounded cervical positions    Date of Onset: 3-1-19    Updated Objective Findings:more erect posture -no headaches-less shoulder complaints   TREATMENT:   THERAPEUTIC EXERCISE: (33 minutes):      Exercises per grid below to improve mobility, strength, balance and coordination. Required minimal verbal cues to promote proper body alignment and promote proper body posture. Progressed resistance, range, repetitions and complexity of movement as indicated.      Date:  19 Date:  19 Date:  19   Activity/Exercise Parameters Parameters Parameters   Cervical stretch 2 x 30 seconds   4 x 30 seconds   Rhomboid stretch 2 x 30 seconds  2 x 30 seconds   Pectoralis major/corner stretch 4 x 30 seconds in doorway  2 x 30 seconds    Backward shoulder shrugs 2 x 10 with red band 2 x 10 2 x 10 with red band   rows 2 x 10 with green band Red 3 x 10 2 x 10 with green band   Low rows 2 x 10 with green band Red 3 x 10 2 x 10 with green band   Shoulder flexion 2 x 10 with yellow band Yellow 1 x 10  x 20 with yellow band and x 20 with red band   Shoulder extension 2 x 10 with yellow band  X 20 with yellow band and x 20 with red band   Shoulder int rot 2 x 10 with yellow band Yellow 2 x 10 X 20 with yellow band and x 20 with red band   Shoulder ext rot  2 x 10 with yellow band Yellow 2 x 10 X 20 with yellow band and x 20 with red band   Reverse wall pushups   X 20   Scapular retractions 2 x 10 with yellow band  1 x 10 X 20 with no resistance and x 20 with yellow band    Cervical rotation  5 x 5 sec    Lateral flexion  5 x 5 sec    chintucks  5 x 5 sec        MANUAL THERAPY: (25 minutes): Joint mobilization and Soft tissue mobilization was utilized and necessary because of the patient's restricted joint motion and painful spasm   Soft tissue mobilization to R deltoid and R  upper trapezius  while seated-effleurage,acupressure and petrissage for tightness x 10 minutes   Occipital release and manual cervical traction x 15 minutes while supine        MODALITIES: 8 minutes): *  Ultrasound was used today secondary to the patient having tightened structures limiting joint motion that require an increase in extensibility. Ultrasound was used today to reduce pain, reduce spasm, reduce joint stiffness and increase muscle flexibility. Cold pack to R shoulder while supine in conjunction with cervical distraction      HEP: As above; handouts given to patient for all exercises. Treatment/Session Summary:    · Response to Treatment:  Decreased pain after session. .-decreased headache-improved cervical posture -patient needs cues to perform band exercises for shoulder flexion/ext/int and ext rotation  · Communication/Consultation:  None today  · Equipment provided today:  None today  · Recommendations/Intent for next treatment session: Next visit will focus on manual therapy and there ex to R deltoid -monitor cervical area and posture  Treatment Plan of Care Effective Dates: 4/1/2019 to 5/17/19    Total Treatment Billable Duration:  58 minutes      PT Patient Time In/Time Out  Time In: 1430  Time Out: [address], PT

## 2019-04-30 ENCOUNTER — HOSPITAL ENCOUNTER (OUTPATIENT)
Dept: PHYSICAL THERAPY | Age: 74
Discharge: HOME | End: 2019-04-30
Attending: NURSE PRACTITIONER
Payer: MEDICARE

## 2019-04-30 PROCEDURE — 97140 MANUAL THERAPY 1/> REGIONS: CPT

## 2019-04-30 PROCEDURE — 97110 THERAPEUTIC EXERCISES: CPT

## 2019-04-30 NOTE — PROGRESS NOTES
Camryn Candelario  : 1945  [place]  [place]: [place] [facility] at [address], [place], [address] [place] [address]  [phone]   [phone]      OUTPATIENT PHYSICAL THERAPY: Daily Treatment Note 2019    Pre-treatment Symptoms/Complaints:  Patient reports no neck pain and minimal R shoulder soreness . Pain: Initial: Pain Intensity 1: 1  Pain Location 1: Shoulder  Pain Orientation 1: Right, Lateral  Pain Intervention(s) 1: Exercise, Cold pack  Post Session: 1/10-decreased R upper trapezius spasm and improved posture -less rounded   Medications Last Reviewed:  2019  REFERRING PHYSICIAN: Dana Allen NP  RETURN PHYSICIAN APPOINTMENT: unsure    Precautions: overhead lifting and static forward/rounded cervical positions    Date of Onset: 3-1-19    Updated Objective Findings:no neck pain -more erect posture -no headaches-less shoulder complaints   TREATMENT:   THERAPEUTIC EXERCISE: (32 minutes):      Exercises per grid below to improve mobility, strength, balance and coordination. Required minimal verbal cues to promote proper body alignment and promote proper body posture. Progressed resistance, range, repetitions and complexity of movement as indicated.      Date:  19 Date:  19 Date:  19   Activity/Exercise Parameters Parameters Parameters   Cervical stretch 2 x 30 seconds   4 x 30 seconds   Rhomboid stretch 2 x 30 seconds  2 x 30 seconds   Pectoralis major/corner stretch 4 x 30 seconds in doorway  2 x 30 seconds    Backward shoulder shrugs 2 x 10 with red band 2 x 10 2 x 10 with red band   rows 2 x 10 with yellow band Red 3 x 10 2 x 10 with green band   Low rows 2 x 10 with yellow band Red 3 x 10 2 x 10 with green band   Shoulder flexion 2 x 10 with yellow band Yellow 1 x 10  x 20 with yellow band and x 20 with red band   Shoulder extension 2 x 10 with yellow band  X 20 with yellow band and x 20 with red band Shoulder int rot 2 x 10 with yellow band Yellow 2 x 10 X 20 with yellow band and x 20 with red band   Shoulder ext rot  2 x 10 with yellow band Yellow 2 x 10 X 20 with yellow band and x 20 with red band   wall pushups 2 x 10     Reverse wall pushups 2 x 10  X 20   Scapular retractions 2 x 10 with yellow band  1 x 10 X 20 with no resistance and x 20 with yellow band    Cervical rotation  5 x 5 sec    Lateral flexion  5 x 5 sec    chintucks  5 x 5 sec        MANUAL THERAPY: (25 minutes): Joint mobilization and Soft tissue mobilization was utilized and necessary because of the patient's restricted joint motion and painful spasm   Soft tissue mobilization to R deltoid and R  upper trapezius  while seated-effleurage,acupressure and petrissage for tightness x 10 minutes   Occipital release and manual cervical traction x 15 minutes while supine        MODALITIES:none): *  Ultrasound was used today secondary to the patient having tightened structures limiting joint motion that require an increase in extensibility. Ultrasound was used today to reduce pain, reduce spasm, reduce joint stiffness and increase muscle flexibility. HEP: As above; handouts given to patient for all exercises. Treatment/Session Summary:    · Response to Treatment:  Decreased pain after session. .-hold therapy x 2 weeks at patient request as she is out of town -continues to need cues  for shoulder flexion/ext/int and ext rotation band work   · Communication/Consultation:  None today  · Equipment provided today:  None today  · Recommendations/Intent for next treatment session: hold therapy x 2 weeks until 5-16-19 at patient request-very good progress  Treatment Plan of Care Effective Dates: 4/1/2019 to 5/17/19    Total Treatment Billable Duration:  57 minutes      PT Patient Time In/Time Out  Time In: 1000  Time Out: [address], PT

## 2019-04-30 NOTE — PROGRESS NOTES
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 Rebecca Vivas is a 68 y.o. female presenting to physical therapy with complaints of R neck and shoulder pain with aches into her R arm and tingling into last two fingers in R hand in C8 pathway for one month since lifting a bag of dog food -pain level is 3/10 with mild headaches-history of bulging disk in cervical region per patient  -mild decreased  in R hand -UE range of motion is wfl -cervical range of motion is limited with pain in L  rotation and L sidebending with increased R cervical pain -very rounded shoulders and very tight B upper trapezius with mild rhomboid spasm -noted kyphosis at cervical thoracic junction-tight anterior /chest wall musculature with weak scapular musculature-very good candidate for skilled physical therapy to include manual therapy/cervical traction, pain modalities as needed and therapeutic exercises with proper postural training -motivated patient     Patient has been seen for 10 visits of R cervical and R shoulder rehab from 19 to 19 with  good progress-cervical pain is minimal to none  at 0-1/10 with  no headaches -cervical motion has improved to wfl-less rounded posture with less forward head on neck posture -shoulder range of motion is wfl with decreased lateral shoulder pain  -UE range of motion is wfl -independent with home exercise program to neck and  R shoulder conditioning program as well on 4-22-19-NDI score has improved from 27/55 to 9/55-motivated patient -most goals met-hold therapy at this time until 5-16-19 when patient will return from being out of town -emphasized to her to perform her home program/yellow theraband  and use cold pack/antiinflammatorries while out of town -pleasant lady to work with-hold therapy at patient request      PROBLEM LIST (Impacting functional limitations):  1. Decreased Strength  2. Decreased ADL/Functional Activities  3. Increased Pain INTERVENTIONS PLANNED: (Treatment may consist of any combination of the following)  1. Cold  2. Electrical Stimulation  3. Heat  4. Home Exercise Program (HEP)  5. Manual Therapy  6. Range of Motion (ROM)  7. Therapeutic Exercise/Strengthening  8. Ultrasound (US)   TREATMENT PLAN:  Effective Dates: 4/1/2019 TO 5/17/2019 (45 days). Frequency/Duration: 2 times a week for 45 Days  GOALS: (Goals have been discussed and agreed upon with patient.)  Short-Term Functional Goals: Time Frame: 4/1/2019 to 5/17/19  1. Patient demonstrates independence with home exercise program without verbal cueing provided by therapist.-GOAL MET  2. Neck and shoulder/upper humeral  Pain is less than 3/10 to progress to DC goal -GOAL MET-LATEST SCORE IS 0-110  3. Cervical range of motion in L rotation and L sidebending is increased to 75%-GOAL MET  4. Decreased upper trapezius spasm by 10%-GOAL MET  5. Less rounded shoulders via consistent exercise program-GOAL MET  Instruction in exercise program to allow increased ADLs. -GOAL MET  Discharge Goals: Time Frame: 4/1/2019 to 5/17/19  1.  Neck and shoulder pain is minimal at 0-1/10 to allow most home ADls including light over head lifting -GOAL MET  2. No headaches-GOAL MET  3. Cervical range of motion is wfl with minimal to no pain or stiffness-GOAL MET-FULL SAFETY WITH DRIVING   4. minimal upper trapezius spasm with less rounded posture via consistent exercise program and breaking up forward/static positions -GOOD PROGRESS  5. DASH score is improved from 27/55 to 14/55-GOAL MET-LATEST SCORE IS 9/55    Outcome Measure: Tool Used: Disabilities of the Arm, Shoulder and Hand (DASH) Questionnaire - Quick Version  Score:  Initial: 27/55  Most Recent: 9/55 (Date:4-30-19 -- )   Interpretation of Score: The DASH is designed to measure the activities of daily living in person's with upper extremity dysfunction or pain. Each section is scored on a 1-5 scale, 5 representing the greatest disability. The scores of each section are added together for a total score of 55. Medical Necessity:   · Hold therapy at this time   Reason for Services/Other Comments:  · Hold therapy at patient request until she comes back into town on 5-16-19  ·   Total Evaluation Duration:       Rehabilitation Potential For Stated Goals: Good  Regarding Robby Rangel therapy, I certify that the treatment plan above will be carried out by a therapist or under their direction. Thank you for this referral,  Tyler Villalobos PT     Referring Physician Signature: Iris Parry NP              Date                     PAIN/SUBJECTIVE:    Initial: Pain Intensity 1: 1  Pain Location 1: Shoulder  Pain Orientation 1: Right, Lateral  Pain Intervention(s) 1: Exercise, Cold pack  Post Session:  2/10 -less spasm -relief with manual therapy     HISTORY:    History of Injury/Illness (Reason for Referral):  R cervical and upper arm pain with tingling into last two fingers of R hand since 3/1/19 after lifting a bag of dog food  Past Medical History/Comorbidities:   Ms. Heavenly Cho  has a past medical history of Endometrial cancer ([facility] [address] Hypothyroidism, and Mitral valve prolapse.   Ms. Heavenly Cho  has a past surgical history that includes hx hysterectomy (2014) and hx tubal ligation (1982). Social History/Living Environment:      Prior Level of Function/Work/Activity:  Independent with home ADLs including yard work   Dominant Side:         RIGHT    Ambulatory/Rehab [facility] Risk Assessment    Risk Factors:  Click here to [employer] Ability to Rise from Chair:       (1)  Pushes up, successful in one attempt    [address]:       No modifications necessary    Total: (5 or greater = High Risk): 1    ©2010 [facility] [address] [place] [address] Patent #[id number]. Federal Law prohibits the replication, distribution or use without written permission from [facility]    Current Medications:        Current Outpatient Medications:     fluticasone (FLONASE) 50 mcg/actuation nasal spray, 2 Sprays by Both Nostrils route daily. , Disp: 1 Bottle, Rfl: 1    albuterol (PROVENTIL HFA, VENTOLIN HFA, PROAIR HFA) 90 mcg/actuation inhaler, Take 2 Puffs by inhalation every four (4) hours as needed for Wheezing., Disp: 1 Inhaler, Rfl: 1    levoFLOXacin (LEVAQUIN) 500 mg tablet, Take 1 Tab by mouth daily. , Disp: 10 Tab, Rfl: 0    levothyroxine (SYNTHROID) 75 mcg tablet, Take 1 Tab by mouth Daily (before breakfast). BRAND NAME ONLY, Disp: 90 Tab, Rfl: 3    valACYclovir (VALTREX) 1 gram tablet, Take 1 Tab by mouth two (2) times daily as needed. , Disp: 30 Tab, Rfl: 0    varicella-zoster recombinant, PF, (SHINGRIX, PF,) 50 mcg/0.5 mL susr injection, IM: 0.5 mL administered as a 2-dose series at 0 and 2 to 6 months later, Disp: 0.5 mL, Rfl: 1    L. gasseri-B. bifidum-B longum 1.5 billion cell cap, Take  by mouth daily. , Disp: , Rfl:     fexofenadine (ALLEGRA) 180 mg tablet, Take 180 mg by mouth daily as needed for Allergies. , Disp: , Rfl:     Date Last Reviewed:  4/30/2019    Number of Personal Factors/Comorbidities that affect the Plan of Care-patient has a history of hypothyroidism, cancer, and cervical disc issues : 1-2: MODERATE COMPLEXITY    EXAMINATION:    Observation/Orthostatic Postural Assessment:         less rounded shoulders with less  forward head on neck posture and less cervico thoracic kyphosis  Palpation:       Less tight bilateral upper trapezius  -less tight pectoralis major from rounded posture via stretches   ROM:          UE range of motion is wfl     Cervical motion is limited at 95% flexion/85% L rotation and 80% L sidebending with less stretching pain      Strength:         UE strength is wfl at 4/5 with  at 23 in dominant R hand and 24 in L hand   Special Tests:         Negative supraspinatus isolation test/negative lift off test for subscapularis/negative drop arm for rotator cuff tear/    Positive negative Obriens test for labrum issues -    positive cervical distraction sign is resolved  Neurological Screen:    Tingling in last two fingers of R hand in C8 pathway have resolved     Mental Status:        Alert and oriented  Sensation:       Intact to light touch but tingling in ring finger and indgiiti minimi of R hand    Body Structures Involved:  1. Nerves  2. Bones  3. Joints  4. Muscles Body Functions Affected:  1. Sensory/Pain  2. Neuromusculoskeletal  3. Movement Related Activities and Participation Affected:  1. Learning and Applying Knowledge  2. General Tasks and Demands  3. Mobility  4. Self Care  5.  Domestic Life    Number of elements (examined above) that affect the Plan of Care: 3: MODERATE COMPLEXITY    CLINICAL PRESENTATION:    Presentation: Evolving clinical presentation with changing clinical characteristics: MODERATE COMPLEXITY    CLINICAL DECISION MAKING:    Use of outcome tool(s) and clinical judgement create a POC that gives a-outcome measure score illustrates up to 39% impairment: Questionable prediction of patient's progress: MODERATE COMPLEXITY

## 2019-05-01 ENCOUNTER — APPOINTMENT (OUTPATIENT)
Dept: PHYSICAL THERAPY | Age: 74
End: 2019-05-01
Attending: NURSE PRACTITIONER

## 2019-05-06 ENCOUNTER — APPOINTMENT (OUTPATIENT)
Dept: PHYSICAL THERAPY | Age: 74
End: 2019-05-06
Attending: NURSE PRACTITIONER

## 2019-05-21 NOTE — THERAPY DISCHARGE
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 Justa Mike is a 68 y.o. female presenting to physical therapy with complaints of R neck and shoulder pain with aches into her R arm and tingling into last two fingers in R hand in C8 pathway for one month since lifting a bag of dog food -pain level is 3/10 with mild headaches-history of bulging disk in cervical region per patient  -mild decreased  in R hand -UE range of motion is wfl -cervical range of motion is limited with pain in L  rotation and L sidebending with increased R cervical pain -very rounded shoulders and very tight B upper trapezius with mild rhomboid spasm -noted kyphosis at cervical thoracic junction-tight anterior /chest wall musculature with weak scapular musculature-very good candidate for skilled physical therapy to include manual therapy/cervical traction, pain modalities as needed and therapeutic exercises with proper postural training -motivated patient     Patient has been seen for 10 visits of R cervical and R shoulder rehab from 19 to 19 with  good progress-cervical pain is minimal to none  at 0-1/10 with  no headaches -cervical motion has improved to wfl-less rounded posture with less forward head on neck posture -shoulder range of motion is wfl with decreased lateral shoulder pain  -UE range of motion is wfl -independent with home exercise program to neck and  R shoulder conditioning program as well on 4-22-19-NDI score has improved from 27/55 to 9/55-motivated patient -most goals met-hold therapy at this time until 5-16-19 when patient will return from being out of town -emphasized to her to perform her home program/yellow theraband  and use cold pack/antiinflammatorries while out of town -pleasant lady to work with-DC to home exercise program       PROBLEM LIST (Impacting functional limitations):  1. Decreased Strength  2. Decreased ADL/Functional Activities  3. Increased Pain INTERVENTIONS PLANNED: (Treatment may consist of any combination of the following)  1. Cold  2. Electrical Stimulation  3. Heat  4. Home Exercise Program (HEP)  5. Manual Therapy  6. Range of Motion (ROM)  7. Therapeutic Exercise/Strengthening  8. Ultrasound (US)   TREATMENT PLAN:  Effective Dates: 4/1/2019 TO 5/17/2019 (45 days). Frequency/Duration: 2 times a week for 45 Days  GOALS: (Goals have been discussed and agreed upon with patient.)  Short-Term Functional Goals: Time Frame: 4/1/2019 to 5/17/19  1. Patient demonstrates independence with home exercise program without verbal cueing provided by therapist.-GOAL MET  2. Neck and shoulder/upper humeral  Pain is less than 3/10 to progress to DC goal -GOAL MET-LATEST SCORE IS 0-110  3. Cervical range of motion in L rotation and L sidebending is increased to 75%-GOAL MET  4. Decreased upper trapezius spasm by 10%-GOAL MET  5. Less rounded shoulders via consistent exercise program-GOAL MET  Instruction in exercise program to allow increased ADLs. -GOAL MET  Discharge Goals: Time Frame: 4/1/2019 to 5/17/19  1. Neck and shoulder pain is minimal at 0-1/10 to allow most home ADls including light over head lifting -GOAL MET  2.  No headaches-GOAL MET  3. Cervical range of motion is wfl with minimal to no pain or stiffness-GOAL MET-FULL SAFETY WITH DRIVING   4. minimal upper trapezius spasm with less rounded posture via consistent exercise program and breaking up forward/static positions -GOOD PROGRESS  5. DASH score is improved from 27/55 to 14/55-GOAL MET-LATEST SCORE IS 9/55    Outcome Measure: Tool Used: Disabilities of the Arm, Shoulder and Hand (DASH) Questionnaire - Quick Version  Score:  Initial: 27/55  Most Recent: 9/55 (Date:4-30-19 -- )   Interpretation of Score: The DASH is designed to measure the activities of daily living in person's with upper extremity dysfunction or pain. Each section is scored on a 1-5 scale, 5 representing the greatest disability. The scores of each section are added together for a total score of 55.       Observation/Orthostatic Postural Assessment:         less rounded shoulders with less  forward head on neck posture and less cervico thoracic kyphosis  Palpation:       Less tight bilateral upper trapezius  -less tight pectoralis major from rounded posture via stretches   ROM:          UE range of motion is wfl     Cervical motion is limited at 95% flexion/85% L rotation and 80% L sidebending with less stretching pain      Strength:         UE strength is wfl at 4/5 with  at 23 in dominant R hand and 24 in L hand   Special Tests:         Negative supraspinatus isolation test/negative lift off test for subscapularis/negative drop arm for rotator cuff tear/    Positive negative Obriens test for labrum issues -    positive cervical distraction sign is resolved  Neurological Screen:    Tingling in last two fingers of R hand in C8 pathway have resolved     Mental Status:        Alert and oriented  Sensation:       Intact to light touch but tingling in ring finger and indgiiti minimi of R hand     Medical Necessity:   · DC to home program  Reason for Services/Other Comments:  · DC to home exercise program Rehabilitation Potential For Stated Goals: Good  Regarding Seth Rodriguez therapy, I certify that the treatment plan above will be carried out by a therapist or under their direction.   Thank you for this referral,  Chava Valverde PT     Referring Physician Signature: Liliam Lei NP              Date

## 2020-10-13 PROBLEM — I48.91 ATRIAL FIBRILLATION (HCC): Status: ACTIVE | Noted: 2020-10-13

## 2021-09-16 ENCOUNTER — HOSPITAL ENCOUNTER (OUTPATIENT)
Dept: GENERAL RADIOLOGY | Age: 76
Discharge: HOME | End: 2021-09-16
Payer: MEDICARE

## 2021-09-16 DIAGNOSIS — M54.9 OTHER CHRONIC BACK PAIN: ICD-10-CM

## 2021-09-16 DIAGNOSIS — G89.29 OTHER CHRONIC BACK PAIN: ICD-10-CM

## 2021-09-16 DIAGNOSIS — Z85.42 HISTORY OF ENDOMETRIAL CANCER: ICD-10-CM

## 2021-09-16 DIAGNOSIS — M85.80 OSTEOPENIA DETERMINED BY X-RAY: ICD-10-CM

## 2021-09-16 PROCEDURE — 72070 X-RAY EXAM THORAC SPINE 2VWS: CPT

## 2021-09-16 PROCEDURE — 72100 X-RAY EXAM L-S SPINE 2/3 VWS: CPT

## 2021-09-16 PROCEDURE — 72040 X-RAY EXAM NECK SPINE 2-3 VW: CPT

## 2021-10-13 ENCOUNTER — HOSPITAL ENCOUNTER (OUTPATIENT)
Dept: MAMMOGRAPHY | Age: 76
Discharge: HOME | End: 2021-10-13
Attending: INTERNAL MEDICINE
Payer: MEDICARE

## 2021-10-13 DIAGNOSIS — G89.29 OTHER CHRONIC BACK PAIN: ICD-10-CM

## 2021-10-13 DIAGNOSIS — Z85.42 HISTORY OF ENDOMETRIAL CANCER: ICD-10-CM

## 2021-10-13 DIAGNOSIS — M85.9 DISORDER OF BONE DENSITY AND STRUCTURE, UNSPECIFIED: ICD-10-CM

## 2021-10-13 DIAGNOSIS — M81.8 OTHER OSTEOPOROSIS WITHOUT CURRENT PATHOLOGICAL FRACTURE: ICD-10-CM

## 2021-10-13 DIAGNOSIS — M85.80 OSTEOPENIA DETERMINED BY X-RAY: ICD-10-CM

## 2021-10-13 DIAGNOSIS — M54.9 OTHER CHRONIC BACK PAIN: ICD-10-CM

## 2021-10-13 PROCEDURE — 77080 DXA BONE DENSITY AXIAL: CPT

## 2022-03-03 PROBLEM — I48.0 PAROXYSMAL ATRIAL FIBRILLATION (HCC): Status: ACTIVE | Noted: 2020-10-13

## 2022-03-03 PROBLEM — N64.4 PAIN OF RIGHT BREAST: Status: ACTIVE | Noted: 2022-03-03

## 2022-03-14 ENCOUNTER — HOSPITAL ENCOUNTER (OUTPATIENT)
Dept: MAMMOGRAPHY | Age: 77
Discharge: HOME | End: 2022-03-14
Attending: INTERNAL MEDICINE
Payer: MEDICARE

## 2022-03-14 DIAGNOSIS — N64.4 PAIN OF RIGHT BREAST: ICD-10-CM

## 2022-03-14 PROCEDURE — 77065 DX MAMMO INCL CAD UNI: CPT

## 2022-03-14 PROCEDURE — 76642 ULTRASOUND BREAST LIMITED: CPT

## 2022-03-19 PROBLEM — N64.4 PAIN OF RIGHT BREAST: Status: ACTIVE | Noted: 2022-03-03

## 2022-03-19 PROBLEM — K57.30 DIVERTICULOSIS OF LARGE INTESTINE WITHOUT HEMORRHAGE: Status: ACTIVE | Noted: 2018-01-24

## 2022-03-19 PROBLEM — E03.9 ACQUIRED HYPOTHYROIDISM: Status: ACTIVE | Noted: 2018-01-24

## 2022-03-19 PROBLEM — M85.80 OSTEOPENIA DETERMINED BY X-RAY: Status: ACTIVE | Noted: 2018-01-24

## 2022-03-20 PROBLEM — I48.0 PAROXYSMAL ATRIAL FIBRILLATION (HCC): Status: ACTIVE | Noted: 2020-10-13

## 2022-06-23 ENCOUNTER — TELEPHONE (OUTPATIENT)
Dept: INTERNAL MEDICINE CLINIC | Facility: CLINIC | Age: 77
End: 2022-06-23

## 2022-06-23 NOTE — TELEPHONE ENCOUNTER
----- Message from Laurie holly sent at 6/23/2022 10:31 AM EDT -----  Subject: Referral Request    QUESTIONS   Reason for referral request? lab orders before 9/26 appt   Has the physician seen you for this condition before? No   Preferred Specialist (if applicable)? Do you already have an appointment scheduled? No  Additional Information for Provider? Please call pt when orders are in   place to schedule a lab appt.  ---------------------------------------------------------------------------  --------------  [address]  What is the best way for the office to contact you? OK to leave message on   voicemail  Preferred Call Back Phone Number? [phone]  ---------------------------------------------------------------------------  --------------  SCRIPT ANSWERS  Relationship to Patient?  Self

## 2022-08-02 RX ORDER — LEVOTHYROXINE SODIUM 112 MCG
TABLET ORAL
Qty: 30 TABLET | OUTPATIENT
Start: 2022-08-02

## 2022-09-06 DIAGNOSIS — E03.9 ACQUIRED HYPOTHYROIDISM: ICD-10-CM

## 2022-09-06 DIAGNOSIS — M85.80 OSTEOPENIA DETERMINED BY X-RAY: Primary | ICD-10-CM

## 2022-09-06 DIAGNOSIS — I48.0 PAROXYSMAL ATRIAL FIBRILLATION (HCC): ICD-10-CM

## 2022-09-06 DIAGNOSIS — M81.8 OTHER OSTEOPOROSIS WITHOUT CURRENT PATHOLOGICAL FRACTURE: ICD-10-CM

## 2022-09-19 ENCOUNTER — NURSE ONLY (OUTPATIENT)
Dept: INTERNAL MEDICINE CLINIC | Facility: CLINIC | Age: 77
End: 2022-09-19

## 2022-09-19 DIAGNOSIS — M81.8 OTHER OSTEOPOROSIS WITHOUT CURRENT PATHOLOGICAL FRACTURE: ICD-10-CM

## 2022-09-19 DIAGNOSIS — E03.9 ACQUIRED HYPOTHYROIDISM: ICD-10-CM

## 2022-09-19 DIAGNOSIS — M85.80 OSTEOPENIA DETERMINED BY X-RAY: ICD-10-CM

## 2022-09-19 DIAGNOSIS — I48.0 PAROXYSMAL ATRIAL FIBRILLATION (HCC): ICD-10-CM

## 2022-09-19 LAB
BASOPHILS # BLD: 0.1 K/UL (ref 0–0.2)
BASOPHILS NFR BLD: 1 % (ref 0–2)
DIFFERENTIAL METHOD BLD: NORMAL
EOSINOPHIL # BLD: 0.3 K/UL (ref 0–0.8)
EOSINOPHIL NFR BLD: 4 % (ref 0.5–7.8)
ERYTHROCYTE [DISTWIDTH] IN BLOOD BY AUTOMATED COUNT: 13.5 % (ref 11.9–14.6)
HCT VFR BLD AUTO: 45.9 % (ref 35.8–46.3)
HGB BLD-MCNC: 14.5 G/DL (ref 11.7–15.4)
IMM GRANULOCYTES # BLD AUTO: 0 K/UL (ref 0–0.5)
IMM GRANULOCYTES NFR BLD AUTO: 0 % (ref 0–5)
LYMPHOCYTES # BLD: 1.5 K/UL (ref 0.5–4.6)
LYMPHOCYTES NFR BLD: 20 % (ref 13–44)
MCH RBC QN AUTO: 30.5 PG (ref 26.1–32.9)
MCHC RBC AUTO-ENTMCNC: 31.6 G/DL (ref 31.4–35)
MCV RBC AUTO: 96.6 FL (ref 79.6–97.8)
MONOCYTES # BLD: 0.7 K/UL (ref 0.1–1.3)
MONOCYTES NFR BLD: 9 % (ref 4–12)
NEUTS SEG # BLD: 5.1 K/UL (ref 1.7–8.2)
NEUTS SEG NFR BLD: 66 % (ref 43–78)
NRBC # BLD: 0 K/UL (ref 0–0.2)
PLATELET # BLD AUTO: 321 K/UL (ref 150–450)
PMV BLD AUTO: 10.1 FL (ref 9.4–12.3)
RBC # BLD AUTO: 4.75 M/UL (ref 4.05–5.2)
WBC # BLD AUTO: 7.7 K/UL (ref 4.3–11.1)

## 2022-09-20 LAB
25(OH)D3 SERPL-MCNC: 56.6 NG/ML (ref 30–100)
ALBUMIN SERPL-MCNC: 4 G/DL (ref 3.2–4.6)
ALBUMIN/GLOB SERPL: 1.2 {RATIO} (ref 1.2–3.5)
ALP SERPL-CCNC: 59 U/L (ref 50–136)
ALT SERPL-CCNC: 30 U/L (ref 12–65)
ANION GAP SERPL CALC-SCNC: 5 MMOL/L (ref 4–13)
APPEARANCE UR: CLEAR
AST SERPL-CCNC: 17 U/L (ref 15–37)
BACTERIA URNS QL MICRO: NEGATIVE /HPF
BILIRUB SERPL-MCNC: 0.5 MG/DL (ref 0.2–1.1)
BILIRUB UR QL: NEGATIVE
BUN SERPL-MCNC: 25 MG/DL (ref 8–23)
CALCIUM SERPL-MCNC: 9.7 MG/DL (ref 8.3–10.4)
CASTS URNS QL MICRO: ABNORMAL /LPF
CHLORIDE SERPL-SCNC: 110 MMOL/L (ref 101–110)
CHOLEST SERPL-MCNC: 149 MG/DL
CO2 SERPL-SCNC: 27 MMOL/L (ref 21–32)
COLOR UR: ABNORMAL
CREAT SERPL-MCNC: 1.1 MG/DL (ref 0.6–1)
EPI CELLS #/AREA URNS HPF: ABNORMAL /HPF
GLOBULIN SER CALC-MCNC: 3.3 G/DL (ref 2.3–3.5)
GLUCOSE SERPL-MCNC: 106 MG/DL (ref 65–100)
GLUCOSE UR STRIP.AUTO-MCNC: NEGATIVE MG/DL
HDLC SERPL-MCNC: 75 MG/DL (ref 40–60)
HDLC SERPL: 2 {RATIO}
HGB UR QL STRIP: NEGATIVE
KETONES UR QL STRIP.AUTO: NEGATIVE MG/DL
LDLC SERPL CALC-MCNC: 65.6 MG/DL
LEUKOCYTE ESTERASE UR QL STRIP.AUTO: NEGATIVE
MUCOUS THREADS URNS QL MICRO: 0 /LPF
NITRITE UR QL STRIP.AUTO: NEGATIVE
PH UR STRIP: 5.5 [PH] (ref 5–9)
POTASSIUM SERPL-SCNC: 4.8 MMOL/L (ref 3.5–5.1)
PROT SERPL-MCNC: 7.3 G/DL (ref 6.3–8.2)
PROT UR STRIP-MCNC: NEGATIVE MG/DL
RBC #/AREA URNS HPF: ABNORMAL /HPF
SODIUM SERPL-SCNC: 142 MMOL/L (ref 136–145)
SP GR UR REFRACTOMETRY: 1.01 (ref 1–1.02)
TRIGL SERPL-MCNC: 42 MG/DL (ref 35–150)
TSH, 3RD GENERATION: 4.38 UIU/ML (ref 0.36–3.74)
URINE CULTURE IF INDICATED: ABNORMAL
UROBILINOGEN UR QL STRIP.AUTO: 0.2 EU/DL (ref 0.2–1)
VLDLC SERPL CALC-MCNC: 8.4 MG/DL (ref 6–23)
WBC URNS QL MICRO: ABNORMAL /HPF

## 2022-09-26 ENCOUNTER — OFFICE VISIT (OUTPATIENT)
Dept: INTERNAL MEDICINE CLINIC | Facility: CLINIC | Age: 77
End: 2022-09-26
Payer: MEDICARE

## 2022-09-26 VITALS
BODY MASS INDEX: 23.56 KG/M2 | DIASTOLIC BLOOD PRESSURE: 72 MMHG | HEART RATE: 51 BPM | HEIGHT: 60 IN | OXYGEN SATURATION: 98 % | SYSTOLIC BLOOD PRESSURE: 138 MMHG | TEMPERATURE: 97.3 F | WEIGHT: 120 LBS

## 2022-09-26 DIAGNOSIS — Z23 ENCOUNTER FOR IMMUNIZATION: ICD-10-CM

## 2022-09-26 DIAGNOSIS — E03.9 ACQUIRED HYPOTHYROIDISM: Primary | ICD-10-CM

## 2022-09-26 DIAGNOSIS — Z85.42 HISTORY OF ENDOMETRIAL CANCER: ICD-10-CM

## 2022-09-26 DIAGNOSIS — I48.0 PAROXYSMAL ATRIAL FIBRILLATION (HCC): ICD-10-CM

## 2022-09-26 PROCEDURE — 99214 OFFICE O/P EST MOD 30 MIN: CPT | Performed by: INTERNAL MEDICINE

## 2022-09-26 PROCEDURE — 90694 VACC AIIV4 NO PRSRV 0.5ML IM: CPT | Performed by: INTERNAL MEDICINE

## 2022-09-26 PROCEDURE — G0008 ADMIN INFLUENZA VIRUS VAC: HCPCS | Performed by: INTERNAL MEDICINE

## 2022-09-26 PROCEDURE — 1123F ACP DISCUSS/DSCN MKR DOCD: CPT | Performed by: INTERNAL MEDICINE

## 2022-09-26 RX ORDER — LEVOTHYROXINE SODIUM 112 UG/1
56 TABLET ORAL
Qty: 45 TABLET | Refills: 3 | Status: SHIPPED | OUTPATIENT
Start: 2022-09-26

## 2022-09-26 ASSESSMENT — ENCOUNTER SYMPTOMS
SHORTNESS OF BREATH: 0
CHEST TIGHTNESS: 0

## 2022-09-26 NOTE — PROGRESS NOTES
ASSESSMENT/PLAN:    1. Acquired hypothyroidism  Treatment regimen is effective. Medication refilled      - levothyroxine (SYNTHROID) 112 MCG tablet; Take 0.5 tablets by mouth every morning (before breakfast) BRAND NAME  Dispense: 45 tablet; Refill: 3  - TSH; Future  - T4, Free; Future    2. Encounter for immunization     - Influenza, FLUAD, (age 72 y+), IM, Preservative Free, 0.5 mL    3. Paroxysmal atrial fibrillation (HCC)  RRR on exam.  Monitor BP at home. Recommend monitoring blood pressure with a home blood pressure monitoring device / cuff. This can be obtained at any drug store or local retail store. Check blood pressure a few times per week. If blood pressure is consistently over 140/90, please let me know or follow up sooner so this can be appropriately addressed for you. Elevated blood pressure over long periods of time will elevate stroke and heart attack risk. 4. History of endometrial cancer  Gyn/Onc pelvic exam 9/26/22  Mammogram completed last week. Results requested to review from [facility] radiology. Not available on CareEverywhere on my review today. Evaluation and management of the chronic condition(s) delineated. No negative side effects reported. I have reviewed all the lab results. There are some abnormalities that are either expected or not critical to the patient's health, and are discussed in the office today and are addressed. Please refer to the above assessement and plan narrative and orders and follow up plan. Medication discussed and refilled as needed. Physical exam findings are stable unless otherwise indicated and this is addressed. The most recent lab work and imaging and consultant/urgent care visits and imaging are reviewed and discussed and considered during this visit encounter. 1. Acquired hypothyroidism  -     levothyroxine (SYNTHROID) 112 MCG tablet;  Take 0.5 tablets by mouth every morning (before breakfast) BRAND NAME, Disp-45 tablet, R-3Normal  -     TSH; Future  -     T4, Free; Future  2. Encounter for immunization  -     Influenza, FLUAD, (age 72 y+), IM, Preservative Free, 0.5 mL  3. Paroxysmal atrial fibrillation (HCC)  4. History of endometrial cancer         On this date, 22, I have spent 30 minutes reviewing previous notes, test results and face to face with the patient discussing the diagnosis and importance of compliance with the treatment plan as well as documenting on the day of the visit. An electronic signature was used to authenticate this note. -- Nadege Reynolds MD     Return in about 6 months (around 3/26/2023). SUBJECTIVE/OBJECTIVE:      HPI:   Carlos Liriano (: 1945 is a 69 y.o. female, here for evaluation of the following chief complaint(s):   Chief Complaint   Patient presents with    Hypothyroidism     6 month recheck    Discuss Labs       Patient is here for follow-up and management of chronic medical conditions, review of recent labs, review of any imaging completed since our last office visit and discuss any consultants opinions or management changes. History of hypothyroidism and paroxysmal atrial fibrillation. No chest pain or shortness of breath. She has a history of endometrial cancer. Hypothyroidism: Recent symptoms: none. She denies fatigue, weight loss, dry skin, constipation, and edema. Patient is  taking her medication consistently on an empty stomach. No results found for: [facility]  Lab Results   Component Value Date    TSH 4.010 2022    TSH 3.180 2021    TSH 2.580 2021       Pelvic exam with GYN onc 22. BP was elevated. Improved in office currently. No cp or sob or tia. White coat htn and has anxiety at doctors visits. Right breast tenderness. at last visit has resolved. Mammogram Result (most recent):  [facility] DIGITAL DIAGNOSTIC W OR WO CAD RIGHT 2022    Narrative  This is a summary report.  The complete report is available in the patient's medical record. If you cannot access the medical record, please contact the sending organization for a detailed fax or copy. DIAGNOSTIC MAMMOGRAM RIGHT BREAST    HISTORY: Diffuse, migrating right breast pain; right nipple pain    COMPARISON:    FINDINGS: The breast parenchyma is composed of scattered fibroglandular  densities. There are no suspicious masses, areas of architectural distortion, or  suspicious pleomorphic microcalcifications. The patient was referred to sonography for further evaluation of the  retroareolar pain. RIGHT BREAST ULTRASOUND: Sonography of the retroareolar right breast  demonstrates no masses. Impression  No imaging findings suspicious for malignancy. Clinical management  of breast pain is recommended. The patient should return in September 2022 for  annual bilateral mammography unless earlier evaluation is indicated clinically. BI-RADS Assessment Category 2: Benign finding. A 1 yr screening mammogram is  recommended. A reminder letter will be sent to the patient. US Result (most recent):  US RIGHT BREAST LIMITED 03/14/2022    Narrative  This is a summary report. The complete report is available in the patient's medical record. If you cannot access the medical record, please contact the sending organization for a detailed fax or copy. DIAGNOSTIC MAMMOGRAM RIGHT BREAST    HISTORY: Diffuse, migrating right breast pain; right nipple pain    COMPARISON:    FINDINGS: The breast parenchyma is composed of scattered fibroglandular  densities. There are no suspicious masses, areas of architectural distortion, or  suspicious pleomorphic microcalcifications. The patient was referred to sonography for further evaluation of the  retroareolar pain. RIGHT BREAST ULTRASOUND: Sonography of the retroareolar right breast  demonstrates no masses. Impression  No imaging findings suspicious for malignancy. Clinical management  of breast pain is recommended.  The patient should return in September 2022 for  annual bilateral mammography unless earlier evaluation is indicated clinically. BI-RADS Assessment Category 2: Benign finding. A 1 yr screening mammogram is  recommended. A reminder letter will be sent to the patient. Ultrasound Result (most recent):  US RIGHT BREAST LIMITED 03/14/2022    Narrative  This is a summary report. The complete report is available in the patient's medical record. If you cannot access the medical record, please contact the sending organization for a detailed fax or copy. DIAGNOSTIC MAMMOGRAM RIGHT BREAST    HISTORY: Diffuse, migrating right breast pain; right nipple pain    COMPARISON:    FINDINGS: The breast parenchyma is composed of scattered fibroglandular  densities. There are no suspicious masses, areas of architectural distortion, or  suspicious pleomorphic microcalcifications. The patient was referred to sonography for further evaluation of the  retroareolar pain. RIGHT BREAST ULTRASOUND: Sonography of the retroareolar right breast  demonstrates no masses. Impression  No imaging findings suspicious for malignancy. Clinical management  of breast pain is recommended. The patient should return in September 2022 for  annual bilateral mammography unless earlier evaluation is indicated clinically. BI-RADS Assessment Category 2: Benign finding. A 1 yr screening mammogram is  recommended. A reminder letter will be sent to the patient. Her labs are reviewed and discussed in detail. All questions were answered. She has some chronic neck pain and kyphosis. At the last visit we obtained x-rays and we reviewed these in detail and discussed the copy of these images and report is given to the patient. She has no numbness or tingling in the arms or weakness in the  arms at this time. She has no headache. Some neck stiffness at times.     DEXA Result (most recent):  DEXA BONE DENSITY AXIAL SKELETON 10/13/2021    Narrative  BONE DENSITOMETRY:    CLINICAL HISTORY:  Postmenopausal screening. COMPARISON:  None available. FINDINGS:      LUMBAR SPINE L3-L4:  T-score = -0.3  Exclusions:  Density elsewhere is artifactually elevated by sclerosis associated  with apparent spondylosis. LEFT HIP:  T-score = -0.9. RIGHT HIP:  T-score = -1.1. Minimal Density Femoral Necks:  0.880 g/square cm;  T-score = -1.1. FRAX INDEX (10-year probability of fracture): Hip fracture:                        1. 7%. Major osteoporotic fracture:  10%. Impression  OSTEOPENIA. FOLLOW-UP SCAN SHOULD BE CONSIDERED IN 2 YEARS. Labs reviewed and discussed and medication refilled as needed for chronic medications during ov or adjusted based on lab results and/or our discussion as appropriate. See discussion. The patient's available records and electronic chart records are reviewed. The PMH, PSH, medications, allergies, medications, FH, health maintenance and vaccination status are all reviewed and updated as appropriate. Records from outside providers have been reviewed, summarized, and considered as noted in the history of present illness, past medical history, and objective data of this note and encounter.           Health Maintenance   Topic Date Due    COVID-19 Vaccine (4 - Booster for [place] series) 02/01/2022    Annual Wellness Visit (AWV)  08/31/2022    Depression Screen  03/03/2023    DTaP/Tdap/Td vaccine (3 - Td or Tdap) 09/15/2030    DEXA (modify frequency per FRAX score)  Completed    Flu vaccine  Completed    Shingles vaccine  Completed    Pneumococcal 65+ years Vaccine  Completed    Hepatitis C screen  Completed    Hepatitis A vaccine  Aged Out    Hepatitis B vaccine  Aged Out    Hib vaccine  Aged Out    Meningococcal (ACWY) vaccine  Aged Out     Patient Active Problem List   Diagnosis    History of endometrial cancer    Diverticulosis of large intestine without hemorrhage    Pain of right breast Osteopenia determined by x-ray    Acquired hypothyroidism    Paroxysmal atrial fibrillation (HCC)       Review of Systems   Constitutional:  Negative for activity change and fatigue. Eyes:  Negative for visual disturbance. Respiratory:  Negative for chest tightness and shortness of breath. Cardiovascular:  Negative for chest pain, palpitations and leg swelling. Endocrine: Negative for polydipsia and polyuria. Genitourinary:  Negative for dysuria. Musculoskeletal:  Negative for myalgias. Skin:  Negative for wound. Neurological:  Negative for headaches. Psychiatric/Behavioral:  Negative for dysphoric mood.       Lab Results   Component Value Date/Time    WBC 7.7 09/19/2022 10:17 AM    HGB 14.5 09/19/2022 10:17 AM    HCT 45.9 09/19/2022 10:17 AM    MCV 96.6 09/19/2022 10:17 AM    RDW 13.5 09/19/2022 10:17 AM     09/19/2022 10:17 AM    NEUTOPHILPCT 57 02/24/2022 08:57 AM    LYMPHOPCT 20 09/19/2022 10:17 AM    LYMPHOPCT 24 02/24/2022 08:57 AM    MONOPCT 9 09/19/2022 10:17 AM    MONOPCT 12 02/24/2022 08:57 AM    EOSRELPCT 4 09/19/2022 10:17 AM    BASOPCT 1 09/19/2022 10:17 AM    BASOPCT 2 02/24/2022 08:57 AM    LYMPHSABS 1.5 09/19/2022 10:17 AM    LYMPHSABS 1.5 02/24/2022 08:57 AM    MONOSABS 0.7 09/19/2022 10:17 AM    MONOSABS 0.7 02/24/2022 08:57 AM    EOSABS 0.3 09/19/2022 10:17 AM    EOSABS 0.3 02/24/2022 08:57 AM    BASOSABS 0.1 09/19/2022 10:17 AM    IMMGRAN 0 09/19/2022 10:17 AM    GRANULOCYTEABSOLUTECOUNT 0.0 02/24/2022 08:57 AM       Lab Results   Component Value Date/Time     09/19/2022 10:17 AM    K 4.8 09/19/2022 10:17 AM     09/19/2022 10:17 AM    CO2 27 09/19/2022 10:17 AM    ANIONGAP 5 09/19/2022 10:17 AM    GLUCOSE 106 09/19/2022 10:17 AM    BUN 25 09/19/2022 10:17 AM    CREATININE 1.10 09/19/2022 10:17 AM    GFRAA >60 09/19/2022 10:17 AM    LABGLOM 51 09/19/2022 10:17 AM    CALCIUM 9.7 09/19/2022 10:17 AM    BILITOT 0.5 09/19/2022 10:17 AM    ALT 30 09/19/2022 10:17 AM AST 17 09/19/2022 10:17 AM    ALKPHOS 59 09/19/2022 10:17 AM    ALKPHOS 59 02/24/2022 08:57 AM    PROT 7.3 09/19/2022 10:17 AM    LABALBU 4.0 09/19/2022 10:17 AM    GLOB 3.3 09/19/2022 10:17 AM    ALBUMIN 1.2 09/19/2022 10:17 AM       Lab Results   Component Value Date/Time    CHOL 149 09/19/2022 10:17 AM    HDL 75 09/19/2022 10:17 AM    TRIG 42 09/19/2022 10:17 AM    LDLCALC 65.6 09/19/2022 10:17 AM    VLDL 8 02/24/2022 08:57 AM       No results found for: LABA1C    Lab Results   Component Value Date/Time    TSH 4.010 02/24/2022 08:57 AM    TSH 3.180 08/23/2021 09:20 AM    TSH 2.580 05/21/2021 10:28 AM       No results found for: PSA    Lab Results   Component Value Date/Time    SPECGRAV 1.010 09/19/2022 10:17 AM    PHUR 6.5 08/10/2020 08:51 AM    COLORU YELLOW/STRAW 09/19/2022 10:17 AM    CLARITYU Clear 08/10/2020 08:51 AM    LEUKOCYTESUR Negative 09/19/2022 10:17 AM    PROTEINU Negative 09/19/2022 10:17 AM    GLUCOSEU Negative 09/19/2022 10:17 AM    KETUA Negative 09/19/2022 10:17 AM    BLOODU Negative 09/19/2022 10:17 AM    BILIRUBINUR Negative 09/19/2022 10:17 AM    BILIRUBINUR Negative 08/10/2020 08:51 AM    UROBILINOGEN 0.2 09/19/2022 10:17 AM    NITRU Negative 09/19/2022 10:17 AM    LABMICR Comment 08/10/2020 08:51 AM           Vitals:    09/26/22 1446   BP: 138/72   Site: Left Upper Arm   Position: Sitting   Cuff Size: Small Adult   Pulse: 51   Temp: 97.3 °F (36.3 °C)   TempSrc: Temporal   SpO2: 98%   Weight: 120 lb (54.4 kg)   Height: 5' (1.524 m)     Wt Readings from Last 3 Encounters:   09/26/22 120 lb (54.4 kg)   03/03/22 117 lb (53.1 kg)   02/01/22 120 lb (54.4 kg)     BP Readings from Last 3 Encounters:   09/26/22 138/72   03/03/22 122/70   02/01/22 136/76     Physical Exam  Vitals and nursing note reviewed. Constitutional:       Appearance: Normal appearance. She is not ill-appearing. HENT:      Head: Normocephalic and atraumatic.    Eyes:      Extraocular Movements: Extraocular movements

## 2022-11-08 ENCOUNTER — OFFICE VISIT (OUTPATIENT)
Dept: CARDIOLOGY CLINIC | Age: 77
End: 2022-11-08
Payer: MEDICARE

## 2022-11-08 VITALS
DIASTOLIC BLOOD PRESSURE: 98 MMHG | BODY MASS INDEX: 23.95 KG/M2 | HEIGHT: 60 IN | HEART RATE: 52 BPM | WEIGHT: 122 LBS | SYSTOLIC BLOOD PRESSURE: 148 MMHG

## 2022-11-08 DIAGNOSIS — I48.0 PAROXYSMAL ATRIAL FIBRILLATION (HCC): Primary | ICD-10-CM

## 2022-11-08 DIAGNOSIS — N18.31 STAGE 3A CHRONIC KIDNEY DISEASE (HCC): ICD-10-CM

## 2022-11-08 DIAGNOSIS — E03.9 ACQUIRED HYPOTHYROIDISM: ICD-10-CM

## 2022-11-08 PROBLEM — N18.30 CHRONIC RENAL DISEASE, STAGE III (HCC): Status: ACTIVE | Noted: 2022-11-08

## 2022-11-08 PROCEDURE — 93000 ELECTROCARDIOGRAM COMPLETE: CPT | Performed by: INTERNAL MEDICINE

## 2022-11-08 PROCEDURE — G8399 PT W/DXA RESULTS DOCUMENT: HCPCS | Performed by: INTERNAL MEDICINE

## 2022-11-08 PROCEDURE — G8427 DOCREV CUR MEDS BY ELIG CLIN: HCPCS | Performed by: INTERNAL MEDICINE

## 2022-11-08 PROCEDURE — G8484 FLU IMMUNIZE NO ADMIN: HCPCS | Performed by: INTERNAL MEDICINE

## 2022-11-08 PROCEDURE — 1090F PRES/ABSN URINE INCON ASSESS: CPT | Performed by: INTERNAL MEDICINE

## 2022-11-08 PROCEDURE — 1036F TOBACCO NON-USER: CPT | Performed by: INTERNAL MEDICINE

## 2022-11-08 PROCEDURE — 1123F ACP DISCUSS/DSCN MKR DOCD: CPT | Performed by: INTERNAL MEDICINE

## 2022-11-08 PROCEDURE — G8420 CALC BMI NORM PARAMETERS: HCPCS | Performed by: INTERNAL MEDICINE

## 2022-11-08 PROCEDURE — 99214 OFFICE O/P EST MOD 30 MIN: CPT | Performed by: INTERNAL MEDICINE

## 2022-11-08 ASSESSMENT — ENCOUNTER SYMPTOMS
RESPIRATORY NEGATIVE: 1
EYES NEGATIVE: 1
GASTROINTESTINAL NEGATIVE: 1
ALLERGIC/IMMUNOLOGIC NEGATIVE: 1

## 2022-11-08 NOTE — PROGRESS NOTES
[facility] CARDIOLOGY  [address] [facility], [address]  PHONE: [phone]        22      NAME:  Angelo Haynes  : 1945  MRN: [id number]     Referring Provider: Shanda Cuello PA-C, [facility]      Reason for Consultation: Atrial fibrillation     Call pt Jose Yee and  Cy     ASSESSMENT and PLAN:  Diagnoses and all orders for this visit:      1. Palpitation      2. Acquired hypothyroidism      3. Paroxysmal atrial fibrillation [facility])     78-year-old female with a history of paroxysmal atrial fibrillation. She also has thyroid disease. We described that it is hard to know if her atrial fibrillation is a cause of her thyroid disease but there certainly is relationship between thyroid  disease and atrial fibrillation. She is currently getting her thyroid medicine titrated. For now, I think is reasonable to continue stroke prophylaxis with Xarelto as her stroke risk is at least 3%/year. In addition we discussed the use of AV node  blockers and the use of prevention of atrial fibrillation. Rate control with propranolol has helped (sobeida with thyroid disease). She had side effects with metoprolol and diltiazem. -AF - on propranolol, doing well. I suspect her thyroid levels drive her symptoms and this treatment has been effective. This continues to be the case in follow up.    -Continue Xarelto. -Thyroid disease - doing well, continue synthroid. -EP follow up in 6 months or PRN. Patient has been instructed and agrees to call our office with any issues or other concerns related to their cardiac condition(s) and/or complaint(s). No follow-up provider specified. Thank you for allowing me to participate in the electrophysiologic care of Ms. Angelo Haynes. Please contact me if any questions or concerns were to arise. Brittney Quiroz.  Luis SHERIDAN, [address] [facility] Cardiac Electrophysiology  [facility] Cardiology  22  3:02 PM    ===================================================================  Chief Complant:    Chief Complaint   Patient presents with    Atrial Fibrillation    6 Month Follow-Up        Consultation is requested by Veda Malin MD for evaluation of Atrial Fibrillation and 6 Month Follow-Up    History:  James Weber is a most pleasant 69 y.o. female with a past medical and cardiac history significant for pAF. She is referred by Delaney Rader PA-C. She was recently seen in the emergency  room with palpitations and tachycardia. She was found to have a new diagnosis of atrial fibrillation. The patient did demonstrate that she had palpitations in her 25s and then recently had runs of palpitations in September. This is however a formal  diagnosis of atrial fibrillation. She was initiated on Eliquis as well as metoprolol. She was changed to Xarelto. It does not appear that she is having recurrent atrial fibrillation. She does describe having some symptoms of dizziness, headache and  fatigue. This could be due to metoprolol. She denies a history of bleeding. She denies a history of stroke. She does have an elevated blood pressure today but it very well could be due to white coat hypertension. She comes in follow up. She's doing much better. No major issues, thyroid levels improving and HRs stabilized. Reviewed her vital signs and all are stable. She is accompanied by her , Dejuan ([employer]). They do plan to move to [place] this year to be closer to their daughter, moving to [facility]. I offered Fide Douglas as an EP cardiologist in [place], [place], if they needed to talk to someone. The patient otherwise denies chest pain, dyspnea, presyncope, syncope or lateralizing symptoms. Cardiac PMH: (Old records have been reviewed and summarized below)      EKG:  (EKG has been independently visualized by me with interpretation below): NSR, sinus arrhythmia, no ischemia. Sinus arrhythmia noted.        Monitor: 11/2020, pAT, brief. pAFL (brief, 46 seconds). ECHO: 9/2020, EF WNL, structurally normal heart. Previous Heart Catheterization: n/a       Stress Test: n/a     Past Medical History, Past Surgical History, Family history, Social History, and Medications were all reviewed with the patient today and updated as necessary. Current Outpatient Medications   Medication Sig Dispense Refill    calcium-vitamin D (OSCAL) 250-125 MG-UNIT per tablet Take 1 tablet by mouth daily      Multiple Vitamin (MULTI-12 IV) Infuse intravenously      levothyroxine (SYNTHROID) 112 MCG tablet Take 0.5 tablets by mouth every morning (before breakfast) BRAND NAME 45 tablet 3    finasteride (PROSCAR) 5 MG tablet Take 5 mg by mouth daily      propranolol (INDERAL LA) 60 MG extended release capsule Take 60 mg by mouth daily      rivaroxaban (XARELTO) 20 MG TABS tablet Take 20 mg by mouth Daily with supper       No current facility-administered medications for this visit. Allergies   Allergen Reactions    Sulfa Antibiotics Hives       Past Medical History:   Diagnosis Date    A-fib ([facility] [address] 01/2021    Endometrial cancer ([facility] [address]     Stage 1    Hypertension 01/2021    Hypothyroidism     Mitral valve prolapse     Shingles 04/2021     Past Surgical History:   Procedure Laterality Date    HYSTERECTOMY (CERVIX STATUS UNKNOWN)  2014    TUBAL LIGATION  1982     Family History   Problem Relation Age of Onset    Diabetes Father     Rheum Arthritis Sister     Cancer Maternal Uncle     Cancer Paternal Uncle     Heart Disease Father     Hypertension Father     Alzheimer's Disease Mother     Stroke Mother     Hypertension Mother      Social History     Tobacco Use    Smoking status: Never    Smokeless tobacco: Never   Substance Use Topics    Alcohol use: Yes       ROS:  A comprehensive review of systems was performed with the pertinent positives and negatives as noted in the HPI in addition to:  Review of Systems   Constitutional: Negative.     HENT: Negative. Eyes: Negative. Respiratory: Negative. Cardiovascular: Negative. Gastrointestinal: Negative. Endocrine: Negative. Genitourinary: Negative. Musculoskeletal: Negative. Skin: Negative. Allergic/Immunologic: Negative. Neurological: Negative. Hematological: Negative. Psychiatric/Behavioral: Negative. All other systems reviewed and are negative. PHYSICAL EXAM:   BP (!) 148/98   Pulse 52   Ht 5' (1.524 m)   Wt 122 lb (55.3 kg)   BMI 23.83 kg/m²      Wt Readings from Last 3 Encounters:   11/08/22 122 lb (55.3 kg)   09/26/22 120 lb (54.4 kg)   03/03/22 117 lb (53.1 kg)     BP Readings from Last 3 Encounters:   11/08/22 (!) 148/98   09/26/22 138/72   03/03/22 122/70     Gen: Well appearing, well developed, no acute distress  Eyes: Pupils equal, round. Extraocular movements are intact  ENT: Oropharynx clear, no oral lesions, normal dentition  CV: S1S2, regular rate and rhythm, no murmurs, rubs or gallops, normal JVD, no carotid bruits, normal distal pulses, no MARCO ANTONIO  Pulm: Clear to auscultation bilaterally, no accessory muscle uses, no wheezes or rales  GI: Soft, NT, ND, +BS  Neuro: Alert and oriented, nonfocal  Psych: Appropriate affect  Skin: Normal color and skin turgor  MSK: Normal muscle bulk and tone    Medical problems and test results were reviewed with the patient today. No results found for any visits on 11/08/22.

## 2023-01-20 RX ORDER — PROPRANOLOL HCL 60 MG
60 CAPSULE, EXTENDED RELEASE 24HR ORAL DAILY
Qty: 90 CAPSULE | Refills: 3 | Status: SHIPPED | OUTPATIENT
Start: 2023-01-20

## 2023-01-20 NOTE — TELEPHONE ENCOUNTER
Requested Prescriptions     Pending Prescriptions Disp Refills    propranolol (INDERAL LA) 60 MG extended release capsule 90 capsule 3     Sig: Take 1 capsule by mouth daily    rivaroxaban (XARELTO) 20 MG TABS tablet 90 tablet 3     Sig: Take 1 tablet by mouth Daily with supper

## 2023-01-20 NOTE — TELEPHONE ENCOUNTER
MEDICATION REFILL REQUEST      Name of Medication:  propranolol  Dose:  60 mg  Frequency:  ?  Quantity:  ?  Days' supply:  ? Pharmacy Name/Location:  no pharmacy info left on message ,please call pt    MEDICATION REFILL REQUEST      Name of Medication:  Xarelto  Dose:  20 mg  Frequency:  ?  Quantity:  ?  Days' supply:  ?       Pharmacy Name/Location:  Please call pt

## 2023-01-26 ENCOUNTER — TELEPHONE (OUTPATIENT)
Dept: INTERNAL MEDICINE CLINIC | Facility: CLINIC | Age: 78
End: 2023-01-26

## 2023-01-26 NOTE — TELEPHONE ENCOUNTER
Initiated a Prior Authorization for [employer] through [employer]NH[employer] Case ID: [id number]    Sent to Plan  Response was no PA needed  Patient notified and she states conflict was due to patient taking 1/2 tablet of 112 mcg Synthroid with quantity of #45  Patient is to mail letter from [employer] to our office to explain.

## 2023-03-20 DIAGNOSIS — E03.9 ACQUIRED HYPOTHYROIDISM: ICD-10-CM

## 2023-03-20 LAB
T4 FREE SERPL-MCNC: 1.2 NG/DL (ref 0.78–1.46)
TSH, 3RD GENERATION: 3.99 UIU/ML (ref 0.36–3.74)

## 2023-03-21 ENCOUNTER — TELEPHONE (OUTPATIENT)
Dept: INTERNAL MEDICINE CLINIC | Facility: CLINIC | Age: 78
End: 2023-03-21

## 2023-03-21 NOTE — TELEPHONE ENCOUNTER
Initiated a Prior Authorization through [employer] on 1/26/2023.  After several weeks a response received was no PA needed.    Patient brought a letter from [employer] stating an Appeal was needed to approve Brand Synthroid.  Number given to call was [phone]    Called and spoke to representative. He ask if patient had tried generic Synthroid? Answer was yes  Case ID# [id number]  Approval was received from 2/19/2023  until 3/20/2024    Patient was notified of Approval

## 2023-03-27 ENCOUNTER — OFFICE VISIT (OUTPATIENT)
Dept: INTERNAL MEDICINE CLINIC | Facility: CLINIC | Age: 78
End: 2023-03-27
Payer: MEDICARE

## 2023-03-27 VITALS
WEIGHT: 122 LBS | OXYGEN SATURATION: 100 % | DIASTOLIC BLOOD PRESSURE: 64 MMHG | BODY MASS INDEX: 23.95 KG/M2 | SYSTOLIC BLOOD PRESSURE: 130 MMHG | HEART RATE: 49 BPM | HEIGHT: 60 IN | TEMPERATURE: 97.5 F

## 2023-03-27 DIAGNOSIS — I48.0 PAROXYSMAL ATRIAL FIBRILLATION (HCC): Primary | ICD-10-CM

## 2023-03-27 DIAGNOSIS — Z85.42 HISTORY OF ENDOMETRIAL CANCER: ICD-10-CM

## 2023-03-27 DIAGNOSIS — E03.9 ACQUIRED HYPOTHYROIDISM: ICD-10-CM

## 2023-03-27 PROBLEM — N18.30 CHRONIC RENAL DISEASE, STAGE III (HCC): Status: RESOLVED | Noted: 2022-11-08 | Resolved: 2023-03-27

## 2023-03-27 PROCEDURE — G8427 DOCREV CUR MEDS BY ELIG CLIN: HCPCS | Performed by: INTERNAL MEDICINE

## 2023-03-27 PROCEDURE — 1036F TOBACCO NON-USER: CPT | Performed by: INTERNAL MEDICINE

## 2023-03-27 PROCEDURE — 99213 OFFICE O/P EST LOW 20 MIN: CPT | Performed by: INTERNAL MEDICINE

## 2023-03-27 PROCEDURE — G8399 PT W/DXA RESULTS DOCUMENT: HCPCS | Performed by: INTERNAL MEDICINE

## 2023-03-27 PROCEDURE — G8484 FLU IMMUNIZE NO ADMIN: HCPCS | Performed by: INTERNAL MEDICINE

## 2023-03-27 PROCEDURE — 1090F PRES/ABSN URINE INCON ASSESS: CPT | Performed by: INTERNAL MEDICINE

## 2023-03-27 PROCEDURE — 1123F ACP DISCUSS/DSCN MKR DOCD: CPT | Performed by: INTERNAL MEDICINE

## 2023-03-27 PROCEDURE — G8420 CALC BMI NORM PARAMETERS: HCPCS | Performed by: INTERNAL MEDICINE

## 2023-03-27 RX ORDER — LEVOTHYROXINE SODIUM 112 UG/1
56 TABLET ORAL
Qty: 45 TABLET | Refills: 3 | Status: SHIPPED | OUTPATIENT
Start: 2023-03-27

## 2023-03-27 ASSESSMENT — ENCOUNTER SYMPTOMS
CHEST TIGHTNESS: 0
SHORTNESS OF BREATH: 0

## 2023-03-27 NOTE — PROGRESS NOTES
DENSITOMETRY:    CLINICAL HISTORY:  Postmenopausal screening. COMPARISON:  None available. FINDINGS:      LUMBAR SPINE L3-L4:  T-score = -0.3  Exclusions:  Density elsewhere is artifactually elevated by sclerosis associated  with apparent spondylosis. LEFT HIP:  T-score = -0.9. RIGHT HIP:  T-score = -1.1. Minimal Density Femoral Necks:  0.880 g/square cm;  T-score = -1.1. FRAX INDEX (10-year probability of fracture): Hip fracture:                        1. 7%. Major osteoporotic fracture:  10%. Impression  OSTEOPENIA. FOLLOW-UP SCAN SHOULD BE CONSIDERED IN 2 YEARS. Referred to rheumatology by dermatologist for some hand joint swelling after cyst drainage and some joint swelling in the proximal hands. Right index finger cyst drained and proximal left index finger joint swelling. Improved. She had some trauma to this finger prior to swelling. She will keep me updated. Labs reviewed and discussed and medication refilled as needed for chronic medications during ov or adjusted based on lab results and/or our discussion as appropriate. See discussion. The patient's available records and electronic chart records are reviewed. The PMH, PSH, medications, allergies, medications, FH, health maintenance and vaccination status are all reviewed and updated as appropriate. Records from outside providers have been reviewed, summarized, and considered as noted in the history of present illness, past medical history, and objective data of this note and encounter.           Health Maintenance   Topic Date Due    COVID-19 Vaccine (4 - Booster for Dandelion series) 11/26/2021    Annual Wellness Visit (AWV)  08/31/2022    Depression Screen  03/03/2023    Breast cancer screen  09/19/2024    DTaP/Tdap/Td vaccine (3 - Td or Tdap) 09/15/2030    DEXA (modify frequency per FRAX score)  Completed    Flu vaccine  Completed    Shingles vaccine  Completed    Pneumococcal 65+ years Vaccine  Completed

## 2023-05-08 ENCOUNTER — OFFICE VISIT (OUTPATIENT)
Age: 78
End: 2023-05-08
Payer: MEDICARE

## 2023-05-08 VITALS
BODY MASS INDEX: 23.75 KG/M2 | HEART RATE: 44 BPM | SYSTOLIC BLOOD PRESSURE: 138 MMHG | WEIGHT: 121 LBS | DIASTOLIC BLOOD PRESSURE: 80 MMHG | HEIGHT: 60 IN

## 2023-05-08 DIAGNOSIS — R00.1 BRADYCARDIA: ICD-10-CM

## 2023-05-08 DIAGNOSIS — I48.0 PAROXYSMAL ATRIAL FIBRILLATION (HCC): Primary | ICD-10-CM

## 2023-05-08 PROCEDURE — G8427 DOCREV CUR MEDS BY ELIG CLIN: HCPCS | Performed by: INTERNAL MEDICINE

## 2023-05-08 PROCEDURE — 1090F PRES/ABSN URINE INCON ASSESS: CPT | Performed by: INTERNAL MEDICINE

## 2023-05-08 PROCEDURE — 1036F TOBACCO NON-USER: CPT | Performed by: INTERNAL MEDICINE

## 2023-05-08 PROCEDURE — 1123F ACP DISCUSS/DSCN MKR DOCD: CPT | Performed by: INTERNAL MEDICINE

## 2023-05-08 PROCEDURE — 93000 ELECTROCARDIOGRAM COMPLETE: CPT | Performed by: INTERNAL MEDICINE

## 2023-05-08 PROCEDURE — G8420 CALC BMI NORM PARAMETERS: HCPCS | Performed by: INTERNAL MEDICINE

## 2023-05-08 PROCEDURE — 99214 OFFICE O/P EST MOD 30 MIN: CPT | Performed by: INTERNAL MEDICINE

## 2023-05-08 PROCEDURE — G8399 PT W/DXA RESULTS DOCUMENT: HCPCS | Performed by: INTERNAL MEDICINE

## 2023-05-08 ASSESSMENT — ENCOUNTER SYMPTOMS
ALLERGIC/IMMUNOLOGIC NEGATIVE: 1
EYES NEGATIVE: 1
RESPIRATORY NEGATIVE: 1
GASTROINTESTINAL NEGATIVE: 1

## 2023-05-08 NOTE — PROGRESS NOTES
Stress Test: n/a     Past Medical History, Past Surgical History, Family history, Social History, and Medications were all reviewed with the patient today and updated as necessary. Current Outpatient Medications   Medication Sig Dispense Refill    Multiple Vitamins-Minerals (MULTIVITAMIN ADULTS PO) Take by mouth      Probiotic Product (PROBIOTIC-10 PO) Take by mouth      levothyroxine (SYNTHROID) 112 MCG tablet Take 0.5 tablets by mouth every morning (before breakfast) BRAND NAME 45 tablet 3    propranolol (INDERAL LA) 60 MG extended release capsule Take 1 capsule by mouth daily 90 capsule 3    rivaroxaban (XARELTO) 20 MG TABS tablet Take 1 tablet by mouth Daily with supper 90 tablet 3    calcium-vitamin D (OSCAL) 250-125 MG-UNIT per tablet Take 1 tablet by mouth daily      finasteride (PROSCAR) 5 MG tablet Take 1 tablet by mouth daily       No current facility-administered medications for this visit. Allergies   Allergen Reactions    Sulfa Antibiotics Hives       Past Medical History:   Diagnosis Date    A-fib ([facility] [address] 01/2021    Endometrial cancer ([facility] [address]     Stage 1    Hypertension 01/2021    Hypothyroidism     Mitral valve prolapse     Shingles 04/2021     Past Surgical History:   Procedure Laterality Date    HYSTERECTOMY (CERVIX STATUS UNKNOWN)  2014    TUBAL LIGATION  1982     Family History   Problem Relation Age of Onset    Diabetes Father     Rheum Arthritis Sister     Cancer Maternal Uncle     Cancer Paternal Uncle     Heart Disease Father     Hypertension Father     Alzheimer's Disease Mother     Stroke Mother     Hypertension Mother      Social History     Tobacco Use    Smoking status: Never     Passive exposure: Past    Smokeless tobacco: Never   Substance Use Topics    Alcohol use: Yes       ROS:  A comprehensive review of systems was performed with the pertinent positives and negatives as noted in the HPI in addition to:  Review of Systems   Constitutional: Negative. HENT: Negative.

## 2023-05-26 ENCOUNTER — TELEPHONE (OUTPATIENT)
Age: 78
End: 2023-05-26

## 2023-05-26 NOTE — TELEPHONE ENCOUNTER
Pt returned call to office spoke with pt made her aware per Dr Randi Ivy she had some Brief PAT. Pt verbalized understanding. Confirmed she does not have any new or worsening symptoms and will keep upcoming appts but will follow up with the office sooner if need be.

## 2023-05-26 NOTE — TELEPHONE ENCOUNTER
----- Message from Olga Burgess MD sent at 5/25/2023  4:50 PM EDT -----  Brief pAT  ----- Message -----  From: Olga Burgess MD  Sent: 5/25/2023   4:48 PM EDT  To: Olga Burgess MD

## 2023-09-11 DIAGNOSIS — I48.0 PAROXYSMAL ATRIAL FIBRILLATION (HCC): ICD-10-CM

## 2023-09-11 DIAGNOSIS — Z85.42 HISTORY OF ENDOMETRIAL CANCER: ICD-10-CM

## 2023-09-11 DIAGNOSIS — E03.9 ACQUIRED HYPOTHYROIDISM: ICD-10-CM

## 2023-09-11 NOTE — TELEPHONE ENCOUNTER
Requesting refill on Synthroid. Send to [facility] in [place]. Patient would also like a call back on if she needs the rsv vaccine and how should she go about getting it.

## 2023-09-13 RX ORDER — LEVOTHYROXINE SODIUM 112 UG/1
56 TABLET ORAL
Qty: 45 TABLET | Refills: 3 | Status: SHIPPED | OUTPATIENT
Start: 2023-09-13

## 2023-09-13 RX ORDER — RESPIRATORY SYNCYTIAL VIRUS VACCINE 120MCG/0.5
0.5 KIT INTRAMUSCULAR ONCE
Qty: 1 EACH | Refills: 0 | Status: SHIPPED | OUTPATIENT
Start: 2023-09-13 | End: 2023-09-13

## 2023-09-21 DIAGNOSIS — Z85.42 HISTORY OF ENDOMETRIAL CANCER: ICD-10-CM

## 2023-09-21 DIAGNOSIS — E03.9 ACQUIRED HYPOTHYROIDISM: ICD-10-CM

## 2023-09-21 DIAGNOSIS — I48.0 PAROXYSMAL ATRIAL FIBRILLATION (HCC): ICD-10-CM

## 2023-09-21 NOTE — TELEPHONE ENCOUNTER
Patient requesting a refill of Levothyroxine 1 mg. Patient states that her medication was increased from . 5 mg to 1 mg. Medication is pending, but needs to be adjusted appropriately via PCP.  Last refill was written on 9/13/23 for the .5 tablets

## 2023-09-21 NOTE — TELEPHONE ENCOUNTER
----- Message from Jairon Meade sent at 9/13/2023  2:37 PM EDT -----  Subject: Medication Problem    Medication: levothyroxine (SYNTHROID) 112 MCG tablet  Dosage: Take 0.5 tablets by mouth every morning (before breakfast) BRAND   NAME  Ordering Provider: tania    Question/Problem: Patient said that the dosage was increased by PCP but   pharmacy does not have this information , increase to 1 full table once a   week. , in addition to 1/2 tablet daily      Pharmacy: [address] [place] [address] Rd, [address] [facility] [phone]    ---------------------------------------------------------------------------  --------------  Cayden DEE  [phone]; OK to leave message on voicemail  ---------------------------------------------------------------------------  --------------    SCRIPT ANSWERS  Relationship to Patient: Self

## 2023-09-25 RX ORDER — LEVOTHYROXINE SODIUM 112 UG/1
56 TABLET ORAL
Qty: 45 TABLET | Refills: 3 | Status: SHIPPED | OUTPATIENT
Start: 2023-09-25

## 2023-10-16 ENCOUNTER — HOSPITAL ENCOUNTER (OUTPATIENT)
Dept: LAB | Age: 78
Discharge: HOME | End: 2023-10-19

## 2023-10-23 ENCOUNTER — OFFICE VISIT (OUTPATIENT)
Dept: INTERNAL MEDICINE CLINIC | Facility: CLINIC | Age: 78
End: 2023-10-23
Payer: MEDICARE

## 2023-10-23 VITALS
DIASTOLIC BLOOD PRESSURE: 64 MMHG | WEIGHT: 120 LBS | HEART RATE: 42 BPM | SYSTOLIC BLOOD PRESSURE: 118 MMHG | BODY MASS INDEX: 23.56 KG/M2 | TEMPERATURE: 97 F | OXYGEN SATURATION: 99 % | HEIGHT: 60 IN

## 2023-10-23 DIAGNOSIS — Z72.89 OTHER PROBLEMS RELATED TO LIFESTYLE: ICD-10-CM

## 2023-10-23 DIAGNOSIS — H69.90 DYSFUNCTION OF EUSTACHIAN TUBE, UNSPECIFIED LATERALITY: ICD-10-CM

## 2023-10-23 DIAGNOSIS — E03.9 ACQUIRED HYPOTHYROIDISM: ICD-10-CM

## 2023-10-23 DIAGNOSIS — Z00.00 MEDICARE ANNUAL WELLNESS VISIT, SUBSEQUENT: Primary | ICD-10-CM

## 2023-10-23 DIAGNOSIS — Z85.42 HISTORY OF ENDOMETRIAL CANCER: ICD-10-CM

## 2023-10-23 DIAGNOSIS — I48.0 PAROXYSMAL ATRIAL FIBRILLATION (HCC): ICD-10-CM

## 2023-10-23 DIAGNOSIS — Z11.59 NEED FOR HEPATITIS C SCREENING TEST: ICD-10-CM

## 2023-10-23 DIAGNOSIS — K57.30 DIVERTICULOSIS OF LARGE INTESTINE WITHOUT HEMORRHAGE: ICD-10-CM

## 2023-10-23 PROCEDURE — 99213 OFFICE O/P EST LOW 20 MIN: CPT | Performed by: INTERNAL MEDICINE

## 2023-10-23 PROCEDURE — 1123F ACP DISCUSS/DSCN MKR DOCD: CPT | Performed by: INTERNAL MEDICINE

## 2023-10-23 PROCEDURE — G8427 DOCREV CUR MEDS BY ELIG CLIN: HCPCS | Performed by: INTERNAL MEDICINE

## 2023-10-23 PROCEDURE — 1090F PRES/ABSN URINE INCON ASSESS: CPT | Performed by: INTERNAL MEDICINE

## 2023-10-23 PROCEDURE — 1036F TOBACCO NON-USER: CPT | Performed by: INTERNAL MEDICINE

## 2023-10-23 PROCEDURE — G8420 CALC BMI NORM PARAMETERS: HCPCS | Performed by: INTERNAL MEDICINE

## 2023-10-23 PROCEDURE — G8399 PT W/DXA RESULTS DOCUMENT: HCPCS | Performed by: INTERNAL MEDICINE

## 2023-10-23 PROCEDURE — G8484 FLU IMMUNIZE NO ADMIN: HCPCS | Performed by: INTERNAL MEDICINE

## 2023-10-23 PROCEDURE — G0439 PPPS, SUBSEQ VISIT: HCPCS | Performed by: INTERNAL MEDICINE

## 2023-10-23 RX ORDER — FLUTICASONE PROPIONATE 50 MCG
1 SPRAY, SUSPENSION (ML) NASAL
Qty: 1 EACH | Refills: 0 | Status: SHIPPED | OUTPATIENT
Start: 2023-10-23

## 2023-10-23 ASSESSMENT — PATIENT HEALTH QUESTIONNAIRE - PHQ9
2. FEELING DOWN, DEPRESSED OR HOPELESS: 0
SUM OF ALL RESPONSES TO PHQ9 QUESTIONS 1 & 2: 0
SUM OF ALL RESPONSES TO PHQ QUESTIONS 1-9: 0
SUM OF ALL RESPONSES TO PHQ QUESTIONS 1-9: 0
1. LITTLE INTEREST OR PLEASURE IN DOING THINGS: 0
SUM OF ALL RESPONSES TO PHQ QUESTIONS 1-9: 0
SUM OF ALL RESPONSES TO PHQ QUESTIONS 1-9: 0

## 2023-10-23 ASSESSMENT — ENCOUNTER SYMPTOMS
SHORTNESS OF BREATH: 0
CHEST TIGHTNESS: 0

## 2023-10-23 ASSESSMENT — LIFESTYLE VARIABLES
HOW OFTEN DO YOU HAVE A DRINK CONTAINING ALCOHOL: NEVER
HOW MANY STANDARD DRINKS CONTAINING ALCOHOL DO YOU HAVE ON A TYPICAL DAY: PATIENT DOES NOT DRINK

## 2023-10-23 NOTE — PROGRESS NOTES
ASSESSMENT/PLAN:       1. Medicare annual wellness visit, subsequent  Completed     2. Acquired hypothyroidism  Treatment regimen is effective. - Lipid Panel; Future  - Hemoglobin A1C; Future  - Urinalysis; Future  - Comprehensive Metabolic Panel; Future  - CBC with Auto Differential; Future  - TSH; Future  - T4, Free; Future    3. Paroxysmal atrial fibrillation (HCC)  Treatment regimen is effective. RRR on exam.  Monitor BP at home. Recommend monitoring blood pressure with a home blood pressure monitoring device / cuff. This can be obtained at any drug store or local retail store. Check blood pressure a few times per week. If blood pressure is consistently over 140/90, please let me know or follow up sooner so this can be appropriately addressed for you. Elevated blood pressure over long periods of time will elevate stroke and heart attack risk.        - Lipid Panel; Future  - Hemoglobin A1C; Future  - Urinalysis; Future  - Comprehensive Metabolic Panel; Future  - CBC with Auto Differential; Future    4. Diverticulosis of large intestine without hemorrhage  Treatment regimen is effective. - Lipid Panel; Future  - Hemoglobin A1C; Future  - Urinalysis; Future  - Comprehensive Metabolic Panel; Future  - CBC with Auto Differential; Future    5. History of endometrial cancer  Gyn/Onc pelvic exam 9/26/22     - Lipid Panel; Future  - Hemoglobin A1C; Future  - Urinalysis; Future  - Comprehensive Metabolic Panel; Future  - CBC with Auto Differential; Future    6. Need for hepatitis C screening test  Routine medicare screening.   - Hepatitis C Antibody; Future    7. Other problems related to lifestyle  Routine medicare screening   - Hepatitis C Antibody; Future    8. Dysfunction of Eustachian tube, unspecified laterality     - fluticasone (FLONASE) 50 MCG/ACT nasal spray; 1 spray by Each Nostril route nightly  Dispense: 1 each;  Refill: 0      Evaluation and management of the chronic condition(s)
Medicare Annual Wellness Visit    Broderick Altamirano is here for Medicare AWV and Hypothyroidism    Assessment & Plan   Acquired hypothyroidism  -     Lipid Panel; Future  -     Hemoglobin A1C; Future  -     Urinalysis; Future  -     TSH; Future  -     Comprehensive Metabolic Panel; Future  -     CBC with Auto Differential; Future  Paroxysmal atrial fibrillation (HCC)  -     Lipid Panel; Future  -     Hemoglobin A1C; Future  -     Urinalysis; Future  -     TSH; Future  -     Comprehensive Metabolic Panel; Future  -     CBC with Auto Differential; Future  Diverticulosis of large intestine without hemorrhage  -     Lipid Panel; Future  -     Hemoglobin A1C; Future  -     Urinalysis; Future  -     TSH; Future  -     Comprehensive Metabolic Panel; Future  -     CBC with Auto Differential; Future  History of endometrial cancer  -     Lipid Panel; Future  -     Hemoglobin A1C; Future  -     Urinalysis; Future  -     TSH; Future  -     Comprehensive Metabolic Panel; Future  -     CBC with Auto Differential; Future  Medicare annual wellness visit, subsequent  Need for hepatitis C screening test  -     Hepatitis C Antibody; Future  Other problems related to lifestyle  -     Hepatitis C Antibody; Future    Recommendations for Preventive Services Due: see orders and patient instructions/AVS.  Recommended screening schedule for the next 5-10 years is provided to the patient in written form: see Patient Instructions/AVS.     Return in about 1 year (around 10/23/2024). Subjective   The following acute and/or chronic problems were also addressed today:  See note. Patient's complete Health Risk Assessment and screening values have been reviewed and are found in Flowsheets. The following problems were reviewed today and where indicated follow up appointments were made and/or referrals ordered.     Positive Risk Factor Screenings with Interventions:                       Advanced Directives:  Do you have a Living Will?: (!)
Visit Information    Have you changed or started any medications since your last visit including any over-the-counter medicines, vitamins, or herbal medicines? no   Have you stopped taking any of your medications? Is so, why? -  no  Are you having any side effects from any of your medications? - no    Have you seen any other physician or provider since your last visit? yes - PT, Orthopedic,    Have you had any other diagnostic tests since your last visit? yes - MRI,    Have you been seen in the emergency room and/or had an admission in a hospital since we last saw you?  no   Have you had your routine dental cleaning in the past 6 months?  no     Do you have an active MyChart account? If no, what is the barrier?   Yes    Patient Care Team:  Kylah Wolff MD as PCP - General (Family Medicine)  Kylah Wolff MD as PCP - [facility] Provider  Nayely Corona MD as Consulting Physician (Gastroenterology)    Medical History Review  Past Medical, Family, and Social History reviewed and does not contribute to the patient presenting condition    Health Maintenance   Topic Date Due    Shingles Vaccine (1 of 2) Never done    Diabetic retinal exam  01/12/2017    DTaP/Tdap/Td vaccine (2 - Tdap) 12/15/2020    Diabetic foot exam  11/12/2021 (Originally 12/1/2017)    Diabetic microalbuminuria test  06/11/2022    Lipid screen  06/24/2022    Potassium monitoring  07/21/2022    Creatinine monitoring  07/21/2022    A1C test (Diabetic or Prediabetic)  09/14/2022    Colon cancer screen colonoscopy  09/20/2022    Breast cancer screen  11/14/2022    Pneumococcal 0-64 years Vaccine (2 of 2 - PPSV23) 03/10/2026    Flu vaccine  Completed    COVID-19 Vaccine  Completed    Hepatitis C screen  Completed    HIV screen  Completed    Hepatitis A vaccine  Aged Out    Hib vaccine  Aged Out    Meningococcal (ACWY) vaccine  Aged Out
Diabetes Father     Heart Attack Father        Objective:    /76 (Site: Right Upper Arm, Position: Sitting, Cuff Size: Large Adult) Comment: machine  Pulse 83   Temp 98.1 °F (36.7 °C) (Temporal)   Ht 5' 2.99\" (1.6 m)   Wt 194 lb 3.2 oz (88.1 kg)   BMI 34.41 kg/m²    BP Readings from Last 3 Encounters:   10/07/21 126/76   09/14/21 130/74   08/17/21 118/73       Physical Exam  Vitals and nursing note reviewed. Constitutional:       General: She is not in acute distress. Appearance: Normal appearance. She is not ill-appearing. HENT:      Head: Normocephalic and atraumatic. Mouth/Throat:      Pharynx: Oropharynx is clear. Cardiovascular:      Rate and Rhythm: Normal rate and regular rhythm. Pulses: Normal pulses. Heart sounds: No murmur heard. Pulmonary:      Effort: Pulmonary effort is normal.      Breath sounds: Normal breath sounds. Abdominal:      Palpations: Abdomen is soft. There is no mass. Tenderness: There is no abdominal tenderness. Musculoskeletal:         General: No swelling or tenderness. Normal range of motion. Cervical back: Normal range of motion and neck supple. Neurological:      General: No focal deficit present. Mental Status: She is alert and oriented to person, place, and time.          Lab Results   Component Value Date    WBC 10.2 07/21/2021    HGB 11.1 (L) 07/21/2021    HCT 37.1 07/21/2021     07/21/2021    CHOL 154 06/24/2021    TRIG 217 (H) 06/24/2021    HDL 59 06/24/2021    LDLDIRECT 156 (H) 05/20/2017    ALT 20 02/04/2021    AST 18 02/04/2021     07/21/2021    K 4.6 07/21/2021     07/21/2021    CREATININE 1.01 (H) 07/21/2021    BUN 30 (H) 07/21/2021    CO2 23 07/21/2021    TSH 2.25 08/04/2020    INR 1.0 07/23/2013    GLUF 262 (H) 09/10/2018    LABA1C 7.7 09/14/2021    LABMICR CANNOT BE CALCULATED 06/11/2021     Lab Results   Component Value Date    CALCIUM 9.1 07/21/2021    PHOS 3.1 07/21/2021     Lab Results

## 2023-10-23 NOTE — PATIENT INSTRUCTIONS
benefits include a comprehensive review of your medical history including lifestyle, illnesses that may run in your family, and various assessments and screenings as appropriate. After reviewing your medical record and screening and assessments performed today your provider may have ordered immunizations, labs, imaging, and/or referrals for you. A list of these orders (if applicable) as well as your Preventive Care list are included within your After Visit Summary for your review. Other Preventive Recommendations:    A preventive eye exam performed by an eye specialist is recommended every 1-2 years to screen for glaucoma; cataracts, macular degeneration, and other eye disorders. A preventive dental visit is recommended every 6 months. Try to get at least 150 minutes of exercise per week or 10,000 steps per day on a pedometer . Order or download the FREE \"Exercise & Physical Activity: Your Everyday Guide\" from [employer]. Call 3-146.212.3203 or search [employer] online. You need 9328-6715 mg of calcium and 8557-7253 IU of vitamin D per day. It is possible to meet your calcium requirement with diet alone, but a vitamin D supplement is usually necessary to meet this goal.  When exposed to the sun, use a sunscreen that protects against both UVA and UVB radiation with an SPF of 30 or greater. Reapply every 2 to 3 hours or after sweating, drying off with a towel, or swimming. Always wear a seat belt when traveling in a car. Always wear a helmet when riding a bicycle or motorcycle.

## 2023-11-20 ENCOUNTER — OFFICE VISIT (OUTPATIENT)
Age: 78
End: 2023-11-20
Payer: MEDICARE

## 2023-11-20 VITALS
SYSTOLIC BLOOD PRESSURE: 134 MMHG | HEART RATE: 48 BPM | WEIGHT: 179 LBS | DIASTOLIC BLOOD PRESSURE: 90 MMHG | HEIGHT: 60 IN | BODY MASS INDEX: 35.14 KG/M2

## 2023-11-20 DIAGNOSIS — I48.0 PAROXYSMAL ATRIAL FIBRILLATION (HCC): Primary | ICD-10-CM

## 2023-11-20 PROCEDURE — G8427 DOCREV CUR MEDS BY ELIG CLIN: HCPCS | Performed by: INTERNAL MEDICINE

## 2023-11-20 PROCEDURE — 1036F TOBACCO NON-USER: CPT | Performed by: INTERNAL MEDICINE

## 2023-11-20 PROCEDURE — G8417 CALC BMI ABV UP PARAM F/U: HCPCS | Performed by: INTERNAL MEDICINE

## 2023-11-20 PROCEDURE — G8484 FLU IMMUNIZE NO ADMIN: HCPCS | Performed by: INTERNAL MEDICINE

## 2023-11-20 PROCEDURE — 93000 ELECTROCARDIOGRAM COMPLETE: CPT | Performed by: INTERNAL MEDICINE

## 2023-11-20 PROCEDURE — 99214 OFFICE O/P EST MOD 30 MIN: CPT | Performed by: INTERNAL MEDICINE

## 2023-11-20 PROCEDURE — 1090F PRES/ABSN URINE INCON ASSESS: CPT | Performed by: INTERNAL MEDICINE

## 2023-11-20 PROCEDURE — 1123F ACP DISCUSS/DSCN MKR DOCD: CPT | Performed by: INTERNAL MEDICINE

## 2023-11-20 PROCEDURE — G8399 PT W/DXA RESULTS DOCUMENT: HCPCS | Performed by: INTERNAL MEDICINE

## 2023-11-20 ASSESSMENT — ENCOUNTER SYMPTOMS
EYES NEGATIVE: 1
GASTROINTESTINAL NEGATIVE: 1
RESPIRATORY NEGATIVE: 1
ALLERGIC/IMMUNOLOGIC NEGATIVE: 1

## 2023-11-20 NOTE — PROGRESS NOTES
[facility] CARDIOLOGY  [id number] [place] [address] [facility]  PHONE: [phone]        23      NAME:  Berenice Brito  : 1945  MRN: [id number]     Referring Provider: Jenaro Harris PA-C, [facility]      Reason for Consultation: Atrial fibrillation     Call pt Alix Glaser and  Cy     ASSESSMENT and PLAN:  Diagnoses and all orders for this visit:      1. Palpitation      2. Acquired hypothyroidism      3. Paroxysmal atrial fibrillation [facility])     69-year-old female with a history of paroxysmal atrial fibrillation. She also has thyroid disease. We described that it is hard to know if her atrial fibrillation is a cause of her thyroid disease but there certainly is relationship between thyroid  disease and atrial fibrillation. She is currently getting her thyroid medicine titrated. For now, I think is reasonable to continue stroke prophylaxis with Xarelto as her stroke risk is at least 3%/year. In addition we discussed the use of AV node  blockers and the use of prevention of atrial fibrillation. Rate control with propranolol has helped (sobeida with thyroid disease). She had side effects with metoprolol and diltiazem. Limited for more use of AV node blockers now due to bradycardia. -AF - on propranolol, doing well. I suspect her thyroid levels drive her symptoms and this treatment has been effective.     -Continue Xarelto. -Bradycardia - may explain fatigue. Although she walks 3-4 miles daily, Zio monitor to assess chronotropy. -HTN - mostly controlled. Has had some higher readings at home. Consider use of dpCCB if continues to be high. But need to be careful with lower resting HR. -Thyroid disease - doing well, continue synthroid. Per Dr. Stefania Owens. -EP follow up in 6 months or PRN. Patient has been instructed and agrees to call our office with any issues or other concerns related to their cardiac condition(s) and/or complaint(s).     No follow-up

## 2024-01-25 RX ORDER — PROPRANOLOL HCL 60 MG
60 CAPSULE, EXTENDED RELEASE 24HR ORAL DAILY
Qty: 90 CAPSULE | Refills: 3 | Status: SHIPPED | OUTPATIENT
Start: 2024-01-25

## 2024-01-25 NOTE — TELEPHONE ENCOUNTER
MEDICATION REFILL REQUEST      Name of Medication:  propranolol   Dose:    Frequency:    Quantity:    Days' supply:  90      Pharmacy Name/Location:  [phone] only left a number no name of pharmacy     MEDICATION REFILL REQUEST      Name of Medication:  Xarelto   Dose:    Frequency:    Quantity:    Days' supply:  90      Pharmacy Name/Location:

## 2024-05-15 DIAGNOSIS — E03.9 ACQUIRED HYPOTHYROIDISM: Primary | ICD-10-CM

## 2024-05-15 DIAGNOSIS — Z85.42 HISTORY OF ENDOMETRIAL CANCER: ICD-10-CM

## 2024-05-15 DIAGNOSIS — I48.0 PAROXYSMAL ATRIAL FIBRILLATION (HCC): ICD-10-CM

## 2024-05-15 NOTE — TELEPHONE ENCOUNTER
Pt is requesting a refill of synthroid. Pt requests name brand only. Please send to [facility] on [address] in [place], [place].

## 2024-05-16 RX ORDER — LEVOTHYROXINE SODIUM 112 UG/1
56 TABLET ORAL
Qty: 45 TABLET | Refills: 3 | Status: CANCELLED | OUTPATIENT
Start: 2024-05-16

## 2024-05-16 RX ORDER — LEVOTHYROXINE SODIUM 112 UG/1
TABLET ORAL
Qty: 45 TABLET | Refills: 0 | Status: SHIPPED | OUTPATIENT
Start: 2024-05-16

## 2024-05-17 ENCOUNTER — TELEPHONE (OUTPATIENT)
Dept: INTERNAL MEDICINE CLINIC | Facility: CLINIC | Age: 79
End: 2024-05-17

## 2024-05-17 NOTE — TELEPHONE ENCOUNTER
Macey from [facility] called, states PA is needed for Synthoid 112 mcg.    [facility] in [facility]

## 2024-06-10 DIAGNOSIS — Z85.42 HISTORY OF ENDOMETRIAL CANCER: ICD-10-CM

## 2024-06-10 DIAGNOSIS — I48.0 PAROXYSMAL ATRIAL FIBRILLATION (HCC): ICD-10-CM

## 2024-06-10 DIAGNOSIS — K57.30 DIVERTICULOSIS OF LARGE INTESTINE WITHOUT HEMORRHAGE: ICD-10-CM

## 2024-06-10 DIAGNOSIS — Z11.59 NEED FOR HEPATITIS C SCREENING TEST: ICD-10-CM

## 2024-06-10 DIAGNOSIS — Z72.89 OTHER PROBLEMS RELATED TO LIFESTYLE: ICD-10-CM

## 2024-06-10 DIAGNOSIS — E03.9 ACQUIRED HYPOTHYROIDISM: ICD-10-CM

## 2024-06-10 LAB
ALBUMIN SERPL-MCNC: 4.2 G/DL (ref 3.2–4.6)
ALBUMIN/GLOB SERPL: 1.4 (ref 1–1.9)
ALP SERPL-CCNC: 57 U/L (ref 35–104)
ALT SERPL-CCNC: 24 U/L (ref 12–65)
ANION GAP SERPL CALC-SCNC: 11 MMOL/L (ref 9–18)
APPEARANCE UR: CLEAR
AST SERPL-CCNC: 24 U/L (ref 15–37)
BACTERIA URNS QL MICRO: NEGATIVE /HPF
BASOPHILS # BLD: 0.1 K/UL (ref 0–0.2)
BASOPHILS NFR BLD: 1 % (ref 0–2)
BILIRUB SERPL-MCNC: 0.3 MG/DL (ref 0–1.2)
BILIRUB UR QL: NEGATIVE
BUN SERPL-MCNC: 20 MG/DL (ref 8–23)
CALCIUM SERPL-MCNC: 10 MG/DL (ref 8.8–10.2)
CASTS URNS QL MICRO: ABNORMAL /LPF (ref 0–2)
CHLORIDE SERPL-SCNC: 105 MMOL/L (ref 98–107)
CHOLEST SERPL-MCNC: 151 MG/DL (ref 0–200)
CO2 SERPL-SCNC: 27 MMOL/L (ref 20–28)
COLOR UR: ABNORMAL
CREAT SERPL-MCNC: 1.03 MG/DL (ref 0.6–1.1)
DIFFERENTIAL METHOD BLD: NORMAL
EOSINOPHIL # BLD: 0.3 K/UL (ref 0–0.8)
EOSINOPHIL NFR BLD: 4 % (ref 0.5–7.8)
EPI CELLS #/AREA URNS HPF: ABNORMAL /HPF (ref 0–5)
ERYTHROCYTE [DISTWIDTH] IN BLOOD BY AUTOMATED COUNT: 13.6 % (ref 11.9–14.6)
EST. AVERAGE GLUCOSE BLD GHB EST-MCNC: 128 MG/DL
GLOBULIN SER CALC-MCNC: 3 G/DL (ref 2.3–3.5)
GLUCOSE SERPL-MCNC: 87 MG/DL (ref 70–99)
GLUCOSE UR STRIP.AUTO-MCNC: NEGATIVE MG/DL
HBA1C MFR BLD: 6.1 % (ref 0–5.6)
HCT VFR BLD AUTO: 42.1 % (ref 35.8–46.3)
HCV AB SER QL: NONREACTIVE
HDLC SERPL-MCNC: 73 MG/DL (ref 40–60)
HDLC SERPL: 2.1 (ref 0–5)
HGB BLD-MCNC: 13.7 G/DL (ref 11.7–15.4)
HGB UR QL STRIP: NEGATIVE
IMM GRANULOCYTES # BLD AUTO: 0 K/UL (ref 0–0.5)
IMM GRANULOCYTES NFR BLD AUTO: 0 % (ref 0–5)
KETONES UR QL STRIP.AUTO: NEGATIVE MG/DL
LDLC SERPL CALC-MCNC: 63 MG/DL (ref 0–100)
LEUKOCYTE ESTERASE UR QL STRIP.AUTO: ABNORMAL
LYMPHOCYTES # BLD: 1.5 K/UL (ref 0.5–4.6)
LYMPHOCYTES NFR BLD: 22 % (ref 13–44)
MCH RBC QN AUTO: 30.8 PG (ref 26.1–32.9)
MCHC RBC AUTO-ENTMCNC: 32.5 G/DL (ref 31.4–35)
MCV RBC AUTO: 94.6 FL (ref 82–102)
MONOCYTES # BLD: 0.7 K/UL (ref 0.1–1.3)
MONOCYTES NFR BLD: 10 % (ref 4–12)
NEUTS SEG # BLD: 4.2 K/UL (ref 1.7–8.2)
NEUTS SEG NFR BLD: 63 % (ref 43–78)
NITRITE UR QL STRIP.AUTO: NEGATIVE
NRBC # BLD: 0 K/UL (ref 0–0.2)
PH UR STRIP: 7 (ref 5–9)
PLATELET # BLD AUTO: 306 K/UL (ref 150–450)
PMV BLD AUTO: 10 FL (ref 9.4–12.3)
POTASSIUM SERPL-SCNC: 4.7 MMOL/L (ref 3.5–5.1)
PROT SERPL-MCNC: 7.2 G/DL (ref 6.3–8.2)
PROT UR STRIP-MCNC: NEGATIVE MG/DL
RBC # BLD AUTO: 4.45 M/UL (ref 4.05–5.2)
RBC #/AREA URNS HPF: ABNORMAL /HPF (ref 0–5)
SODIUM SERPL-SCNC: 143 MMOL/L (ref 136–145)
SP GR UR REFRACTOMETRY: 1.01 (ref 1–1.02)
T4 FREE SERPL-MCNC: 1.6 NG/DL (ref 0.9–1.7)
TRIGL SERPL-MCNC: 72 MG/DL (ref 0–150)
TSH, 3RD GENERATION: 1.65 UIU/ML (ref 0.27–4.2)
UROBILINOGEN UR QL STRIP.AUTO: 0.2 EU/DL (ref 0.2–1)
VLDLC SERPL CALC-MCNC: 14 MG/DL (ref 6–23)
WBC # BLD AUTO: 6.7 K/UL (ref 4.3–11.1)
WBC URNS QL MICRO: ABNORMAL /HPF (ref 0–4)

## 2024-07-01 ENCOUNTER — OFFICE VISIT (OUTPATIENT)
Dept: INTERNAL MEDICINE CLINIC | Facility: CLINIC | Age: 79
End: 2024-07-01
Payer: MEDICARE

## 2024-07-01 VITALS
HEART RATE: 50 BPM | BODY MASS INDEX: 24 KG/M2 | SYSTOLIC BLOOD PRESSURE: 160 MMHG | DIASTOLIC BLOOD PRESSURE: 80 MMHG | WEIGHT: 122.25 LBS | OXYGEN SATURATION: 99 % | TEMPERATURE: 97.3 F | HEIGHT: 60 IN

## 2024-07-01 DIAGNOSIS — E03.9 ACQUIRED HYPOTHYROIDISM: Primary | ICD-10-CM

## 2024-07-01 DIAGNOSIS — I48.0 PAROXYSMAL ATRIAL FIBRILLATION (HCC): ICD-10-CM

## 2024-07-01 DIAGNOSIS — H10.9 CONJUNCTIVITIS OF BOTH EYES, UNSPECIFIED CONJUNCTIVITIS TYPE: ICD-10-CM

## 2024-07-01 PROCEDURE — G2211 COMPLEX E/M VISIT ADD ON: HCPCS | Performed by: NURSE PRACTITIONER

## 2024-07-01 PROCEDURE — G8420 CALC BMI NORM PARAMETERS: HCPCS | Performed by: NURSE PRACTITIONER

## 2024-07-01 PROCEDURE — G8399 PT W/DXA RESULTS DOCUMENT: HCPCS | Performed by: NURSE PRACTITIONER

## 2024-07-01 PROCEDURE — 1123F ACP DISCUSS/DSCN MKR DOCD: CPT | Performed by: NURSE PRACTITIONER

## 2024-07-01 PROCEDURE — 99214 OFFICE O/P EST MOD 30 MIN: CPT | Performed by: NURSE PRACTITIONER

## 2024-07-01 PROCEDURE — 1090F PRES/ABSN URINE INCON ASSESS: CPT | Performed by: NURSE PRACTITIONER

## 2024-07-01 PROCEDURE — G8427 DOCREV CUR MEDS BY ELIG CLIN: HCPCS | Performed by: NURSE PRACTITIONER

## 2024-07-01 PROCEDURE — 1036F TOBACCO NON-USER: CPT | Performed by: NURSE PRACTITIONER

## 2024-07-01 RX ORDER — OFLOXACIN 3 MG/ML
1 SOLUTION/ DROPS OPHTHALMIC 4 TIMES DAILY
Qty: 5 ML | Refills: 0 | Status: SHIPPED | OUTPATIENT
Start: 2024-07-01 | End: 2024-07-11

## 2024-07-01 RX ORDER — FLUTICASONE PROPIONATE 50 MCG
1 SPRAY, SUSPENSION (ML) NASAL
Qty: 1 EACH | Refills: 0 | Status: SHIPPED | OUTPATIENT
Start: 2024-07-01

## 2024-07-01 RX ORDER — LEVOTHYROXINE SODIUM 112 UG/1
TABLET ORAL
Qty: 45 TABLET | Refills: 3 | Status: SHIPPED | OUTPATIENT
Start: 2024-07-01

## 2024-07-01 ASSESSMENT — PATIENT HEALTH QUESTIONNAIRE - PHQ9
2. FEELING DOWN, DEPRESSED OR HOPELESS: NOT AT ALL
SUM OF ALL RESPONSES TO PHQ9 QUESTIONS 1 & 2: 0
SUM OF ALL RESPONSES TO PHQ QUESTIONS 1-9: 0
SUM OF ALL RESPONSES TO PHQ QUESTIONS 1-9: 0
1. LITTLE INTEREST OR PLEASURE IN DOING THINGS: NOT AT ALL
SUM OF ALL RESPONSES TO PHQ QUESTIONS 1-9: 0
SUM OF ALL RESPONSES TO PHQ QUESTIONS 1-9: 0

## 2024-07-01 ASSESSMENT — ENCOUNTER SYMPTOMS
ABDOMINAL PAIN: 0
CONSTIPATION: 0
EYE DISCHARGE: 1
NAUSEA: 0
SORE THROAT: 0
EYE PAIN: 0
DIARRHEA: 0
WHEEZING: 0
COUGH: 0
SHORTNESS OF BREATH: 0
VOMITING: 0
EYE REDNESS: 1

## 2024-07-01 NOTE — PROGRESS NOTES
[facility]      2024    Patient Name: Yamileth Shelley  :  1945      Chief Complaint:  Chief Complaint   Patient presents with    6 Month Follow-Up         HPI  Patient presents today for follow-up on chronic conditions and results review.     The patient is a 78 y.o. female who is seen for follow-up of elevated blood pressure. She is exercising and is adherent to low salt diet.  Blood pressure is well controlled at home. She brought a BP log today with readings typically in the 120-140s/60-80 range. Cardiac symptoms: none.     History of pAF. Followed by cardiology. Continues Xarelto and propanolol. Follow-up appt later this month (24).     The patient is a 78 y.o. female who is seen for follow up of hypothyroidism. Current symptoms: none. Symptoms are stable. The patient is following treatment regimen with good compliance.  Current treatment regimen consists of Synthroid 112 mcg daily; 1/2 tablet (56 mcg) all other days of the week and is  taking it first thing in the AM on an empty stomach with no other food/liquids/other medications for at least 45-60 minutes.    Patient has complaint today of bilateral eye redness, drainage, burning and crusting in the morning. Has been ongoing for 4 months. Has been seen by urgent care. Has used Pataday and OTC dry eye drops without improvement.     Labs reviewed and discussed. Medications refilled as needed during office visit or adjusted based on lab results and/or our discussion as appropriate.  See discussion.        Past Medical History:   Diagnosis Date    A-fib ([facility]) 2021    Endometrial cancer ([facility])     Stage 1    Hypertension 2021    Hypothyroidism     Mitral valve prolapse     Shingles 2021       Past Surgical History:   Procedure Laterality Date    HYSTERECTOMY (CERVIX STATUS UNKNOWN)      TUBAL LIGATION  1982       Family History   Problem Relation Age of Onset    Diabetes Father     Rheum Arthritis Sister     Cancer Maternal

## 2024-08-14 ENCOUNTER — PATIENT MESSAGE (OUTPATIENT)
Dept: INTERNAL MEDICINE CLINIC | Facility: CLINIC | Age: 79
End: 2024-08-14

## 2024-08-14 DIAGNOSIS — E03.9 ACQUIRED HYPOTHYROIDISM: Primary | ICD-10-CM

## 2024-08-15 RX ORDER — LEVOTHYROXINE SODIUM 112 MCG
TABLET ORAL
Qty: 45 TABLET | Refills: 3 | Status: SHIPPED | OUTPATIENT
Start: 2024-08-15

## 2024-09-30 ENCOUNTER — OFFICE VISIT (OUTPATIENT)
Age: 79
End: 2024-09-30
Payer: MEDICARE

## 2024-09-30 VITALS
HEIGHT: 60 IN | SYSTOLIC BLOOD PRESSURE: 136 MMHG | DIASTOLIC BLOOD PRESSURE: 68 MMHG | BODY MASS INDEX: 23.97 KG/M2 | HEART RATE: 51 BPM | WEIGHT: 122.1 LBS

## 2024-09-30 DIAGNOSIS — I48.0 PAROXYSMAL ATRIAL FIBRILLATION (HCC): Primary | ICD-10-CM

## 2024-09-30 PROCEDURE — G8428 CUR MEDS NOT DOCUMENT: HCPCS | Performed by: INTERNAL MEDICINE

## 2024-09-30 PROCEDURE — 93000 ELECTROCARDIOGRAM COMPLETE: CPT | Performed by: INTERNAL MEDICINE

## 2024-09-30 PROCEDURE — 99214 OFFICE O/P EST MOD 30 MIN: CPT | Performed by: INTERNAL MEDICINE

## 2024-09-30 PROCEDURE — 1123F ACP DISCUSS/DSCN MKR DOCD: CPT | Performed by: INTERNAL MEDICINE

## 2024-09-30 PROCEDURE — 1036F TOBACCO NON-USER: CPT | Performed by: INTERNAL MEDICINE

## 2024-09-30 PROCEDURE — G8420 CALC BMI NORM PARAMETERS: HCPCS | Performed by: INTERNAL MEDICINE

## 2024-09-30 PROCEDURE — 1090F PRES/ABSN URINE INCON ASSESS: CPT | Performed by: INTERNAL MEDICINE

## 2024-09-30 PROCEDURE — G8399 PT W/DXA RESULTS DOCUMENT: HCPCS | Performed by: INTERNAL MEDICINE

## 2024-09-30 RX ORDER — PROPRANOLOL HCL 60 MG
60 CAPSULE, EXTENDED RELEASE 24HR ORAL DAILY
Qty: 90 CAPSULE | Refills: 3 | Status: SHIPPED | OUTPATIENT
Start: 2024-09-30

## 2024-09-30 ASSESSMENT — ENCOUNTER SYMPTOMS
EYES NEGATIVE: 1
GASTROINTESTINAL NEGATIVE: 1
ALLERGIC/IMMUNOLOGIC NEGATIVE: 1
RESPIRATORY NEGATIVE: 1

## 2024-09-30 NOTE — PROGRESS NOTES
beats. No AF/AFL.       Monitor: 11/2020, pAT, brief. pAFL (brief, 46 seconds).       ECHO: 9/2020, EF WNL, structurally normal heart.       Previous Heart Catheterization: n/a       Stress Test: n/a     Past Medical History, Past Surgical History, Family history, Social History, and Medications were all reviewed with the patient today and updated as necessary.     Current Outpatient Medications   Medication Sig Dispense Refill    propranolol (INDERAL LA) 60 MG extended release capsule Take 1 capsule by mouth daily 90 capsule 3    rivaroxaban (XARELTO) 20 MG TABS tablet Take 1 tablet by mouth Daily with supper 90 tablet 3    SYNTHROID 112 MCG tablet Take 0.5 tablets by mouth every morning before breakfast. Dispense brand only! 45 tablet 3    fluticasone (FLONASE) 50 MCG/ACT nasal spray 1 spray by Each Nostril route nightly 1 each 0    Multiple Vitamins-Minerals (MULTIVITAMIN ADULTS PO) Take by mouth      Probiotic Product (PROBIOTIC-10 PO) Take by mouth      calcium-vitamin D (OSCAL) 250-125 MG-UNIT per tablet Take 1 tablet by mouth daily      finasteride (PROSCAR) 5 MG tablet Take 1 tablet by mouth daily       No current facility-administered medications for this visit.     Allergies   Allergen Reactions    Sulfa Antibiotics Hives     Bactrium       Past Medical History:   Diagnosis Date    A-fib (HCC) 01/2021    Endometrial cancer (HCC)     Stage 1    Hypertension 01/2021    Hypothyroidism     Mitral valve prolapse     Shingles 04/2021     Past Surgical History:   Procedure Laterality Date    HYSTERECTOMY (CERVIX STATUS UNKNOWN)  2014    TUBAL LIGATION  1982     Family History   Problem Relation Age of Onset    Diabetes Father     Rheum Arthritis Sister     Cancer Maternal Uncle     Cancer Paternal Uncle     Heart Disease Father     Hypertension Father     Alzheimer's Disease Mother     Stroke Mother     Hypertension Mother      Social History     Tobacco Use    Smoking status: Never     Passive exposure: Past

## 2024-10-31 NOTE — TELEPHONE ENCOUNTER
Requested Prescriptions     Pending Prescriptions Disp Refills    rivaroxaban (XARELTO) 20 MG TABS tablet 90 tablet 3     Sig: Take 1 tablet by mouth Daily with supper    Verified rx in last OV date 9/30/24. Pharmacy confirmed. Erx as requested

## 2024-12-18 DIAGNOSIS — I48.0 PAROXYSMAL ATRIAL FIBRILLATION (HCC): ICD-10-CM

## 2024-12-18 DIAGNOSIS — K57.30 DIVERTICULOSIS OF LARGE INTESTINE WITHOUT HEMORRHAGE: ICD-10-CM

## 2024-12-18 DIAGNOSIS — E03.9 ACQUIRED HYPOTHYROIDISM: Primary | ICD-10-CM

## 2024-12-18 DIAGNOSIS — Z85.42 HISTORY OF ENDOMETRIAL CANCER: ICD-10-CM

## 2025-01-02 DIAGNOSIS — E03.9 ACQUIRED HYPOTHYROIDISM: ICD-10-CM

## 2025-01-02 RX ORDER — LEVOTHYROXINE SODIUM 112 MCG
TABLET ORAL
Qty: 45 TABLET | Refills: 3 | OUTPATIENT
Start: 2025-01-02

## 2025-01-06 ENCOUNTER — LAB (OUTPATIENT)
Dept: INTERNAL MEDICINE CLINIC | Facility: CLINIC | Age: 80
End: 2025-01-06

## 2025-01-06 DIAGNOSIS — K57.30 DIVERTICULOSIS OF LARGE INTESTINE WITHOUT HEMORRHAGE: ICD-10-CM

## 2025-01-06 DIAGNOSIS — E03.9 ACQUIRED HYPOTHYROIDISM: ICD-10-CM

## 2025-01-06 DIAGNOSIS — I48.0 PAROXYSMAL ATRIAL FIBRILLATION (HCC): ICD-10-CM

## 2025-01-06 DIAGNOSIS — Z85.42 HISTORY OF ENDOMETRIAL CANCER: ICD-10-CM

## 2025-01-06 LAB
ALBUMIN SERPL-MCNC: 4 G/DL (ref 3.2–4.6)
ALBUMIN/GLOB SERPL: 1.2 (ref 1–1.9)
ALP SERPL-CCNC: 60 U/L (ref 35–104)
ALT SERPL-CCNC: 30 U/L (ref 8–45)
ANION GAP SERPL CALC-SCNC: 9 MMOL/L (ref 7–16)
AST SERPL-CCNC: 24 U/L (ref 15–37)
BASOPHILS # BLD: 0.1 K/UL (ref 0–0.2)
BASOPHILS NFR BLD: 1 % (ref 0–2)
BILIRUB SERPL-MCNC: 0.4 MG/DL (ref 0–1.2)
BUN SERPL-MCNC: 24 MG/DL (ref 8–23)
CALCIUM SERPL-MCNC: 10.1 MG/DL (ref 8.8–10.2)
CHLORIDE SERPL-SCNC: 107 MMOL/L (ref 98–107)
CHOLEST SERPL-MCNC: 146 MG/DL (ref 0–200)
CO2 SERPL-SCNC: 27 MMOL/L (ref 20–29)
CREAT SERPL-MCNC: 1.12 MG/DL (ref 0.6–1.1)
DIFFERENTIAL METHOD BLD: ABNORMAL
EOSINOPHIL # BLD: 0.3 K/UL (ref 0–0.8)
EOSINOPHIL NFR BLD: 5 % (ref 0.5–7.8)
ERYTHROCYTE [DISTWIDTH] IN BLOOD BY AUTOMATED COUNT: 13.2 % (ref 11.9–14.6)
EST. AVERAGE GLUCOSE BLD GHB EST-MCNC: 126 MG/DL
GLOBULIN SER CALC-MCNC: 3.3 G/DL (ref 2.3–3.5)
GLUCOSE SERPL-MCNC: 93 MG/DL (ref 70–99)
HBA1C MFR BLD: 6 % (ref 0–5.6)
HCT VFR BLD AUTO: 44.7 % (ref 35.8–46.3)
HDLC SERPL-MCNC: 76 MG/DL (ref 40–60)
HDLC SERPL: 1.9 (ref 0–5)
HGB BLD-MCNC: 14.3 G/DL (ref 11.7–15.4)
IMM GRANULOCYTES # BLD AUTO: 0 K/UL (ref 0–0.5)
IMM GRANULOCYTES NFR BLD AUTO: 0 % (ref 0–5)
LDLC SERPL CALC-MCNC: 61 MG/DL (ref 0–100)
LYMPHOCYTES # BLD: 1.3 K/UL (ref 0.5–4.6)
LYMPHOCYTES NFR BLD: 25 % (ref 13–44)
MCH RBC QN AUTO: 30.4 PG (ref 26.1–32.9)
MCHC RBC AUTO-ENTMCNC: 32 G/DL (ref 31.4–35)
MCV RBC AUTO: 94.9 FL (ref 82–102)
MONOCYTES # BLD: 0.7 K/UL (ref 0.1–1.3)
MONOCYTES NFR BLD: 13 % (ref 4–12)
NEUTS SEG # BLD: 3 K/UL (ref 1.7–8.2)
NEUTS SEG NFR BLD: 56 % (ref 43–78)
NRBC # BLD: 0 K/UL (ref 0–0.2)
PLATELET # BLD AUTO: 332 K/UL (ref 150–450)
PMV BLD AUTO: 10.1 FL (ref 9.4–12.3)
POTASSIUM SERPL-SCNC: 4.7 MMOL/L (ref 3.5–5.1)
PROT SERPL-MCNC: 7.2 G/DL (ref 6.3–8.2)
RBC # BLD AUTO: 4.71 M/UL (ref 4.05–5.2)
SODIUM SERPL-SCNC: 143 MMOL/L (ref 136–145)
T4 FREE SERPL-MCNC: 1.7 NG/DL (ref 0.9–1.7)
TRIGL SERPL-MCNC: 42 MG/DL (ref 0–150)
TSH, 3RD GENERATION: 1.17 UIU/ML (ref 0.27–4.2)
VLDLC SERPL CALC-MCNC: 8 MG/DL (ref 6–23)
WBC # BLD AUTO: 5.4 K/UL (ref 4.3–11.1)

## 2025-01-13 ENCOUNTER — OFFICE VISIT (OUTPATIENT)
Dept: INTERNAL MEDICINE CLINIC | Facility: CLINIC | Age: 80
End: 2025-01-13

## 2025-01-13 VITALS
HEIGHT: 60 IN | WEIGHT: 122.4 LBS | DIASTOLIC BLOOD PRESSURE: 80 MMHG | OXYGEN SATURATION: 99 % | HEART RATE: 50 BPM | TEMPERATURE: 97.2 F | SYSTOLIC BLOOD PRESSURE: 118 MMHG | RESPIRATION RATE: 16 BRPM | BODY MASS INDEX: 24.03 KG/M2

## 2025-01-13 DIAGNOSIS — Z00.00 MEDICARE ANNUAL WELLNESS VISIT, SUBSEQUENT: Primary | ICD-10-CM

## 2025-01-13 DIAGNOSIS — E03.9 ACQUIRED HYPOTHYROIDISM: ICD-10-CM

## 2025-01-13 DIAGNOSIS — L65.9 ALOPECIA: ICD-10-CM

## 2025-01-13 DIAGNOSIS — I48.0 PAROXYSMAL ATRIAL FIBRILLATION (HCC): ICD-10-CM

## 2025-01-13 DIAGNOSIS — Z85.42 HISTORY OF ENDOMETRIAL CANCER: ICD-10-CM

## 2025-01-13 DIAGNOSIS — M72.2 PLANTAR FASCIITIS, RIGHT: ICD-10-CM

## 2025-01-13 RX ORDER — LEVOTHYROXINE SODIUM 112 MCG
TABLET ORAL
Qty: 45 TABLET | Refills: 3 | Status: SHIPPED | OUTPATIENT
Start: 2025-01-13

## 2025-01-13 SDOH — ECONOMIC STABILITY: FOOD INSECURITY: WITHIN THE PAST 12 MONTHS, YOU WORRIED THAT YOUR FOOD WOULD RUN OUT BEFORE YOU GOT MONEY TO BUY MORE.: NEVER TRUE

## 2025-01-13 SDOH — ECONOMIC STABILITY: FOOD INSECURITY: WITHIN THE PAST 12 MONTHS, THE FOOD YOU BOUGHT JUST DIDN'T LAST AND YOU DIDN'T HAVE MONEY TO GET MORE.: NEVER TRUE

## 2025-01-13 ASSESSMENT — PATIENT HEALTH QUESTIONNAIRE - PHQ9
SUM OF ALL RESPONSES TO PHQ QUESTIONS 1-9: 0
1. LITTLE INTEREST OR PLEASURE IN DOING THINGS: NOT AT ALL
SUM OF ALL RESPONSES TO PHQ QUESTIONS 1-9: 0
SUM OF ALL RESPONSES TO PHQ9 QUESTIONS 1 & 2: 0
2. FEELING DOWN, DEPRESSED OR HOPELESS: NOT AT ALL
SUM OF ALL RESPONSES TO PHQ QUESTIONS 1-9: 0
SUM OF ALL RESPONSES TO PHQ QUESTIONS 1-9: 0

## 2025-01-13 NOTE — PATIENT INSTRUCTIONS
applicable) as well as your Preventive Care list are included within your After Visit Summary for your review.

## 2025-01-13 NOTE — PROGRESS NOTES
Medicare Annual Wellness Visit    Yamileth Shelley is here for Follow-up (6 Month follow up) and Medicare AWV    Assessment & Plan   Assessment & Plan  1. Paroxysmal atrial fibrillation.  She is currently on propranolol and Xarelto, the latter of which she takes daily with food. She has a history of frequent heart palpitations and occasional arrhythmias, which have been less frequent recently. She will continue her current medication regimen, including propranolol and Xarelto, to manage her paroxysmal atrial fibrillation and prevent potential stroke.    2. Dry eyes.  She has been diagnosed with dry eye syndrome, which has been particularly bothersome since her trip to [facility]. Her ophthalmologist recommended the use of a heated mask, but this has not provided significant relief. She has not tried omega-3 fatty acids due to previous gastrointestinal discomfort with fish oil. She reports persistent eye irritation, inflammation, redness, and a gritty sensation. She has been using Systane for dry eyes, but continues to experience burning sensations, although less severe than before. She notes that her symptoms seem to worsen with the onset of heat. She will continue using Systane for dry eyes. She is advised to try omega-3 fatty acids, such as fish oil, to help moisturize the eyes.    3. Heel pain.  She has been experiencing intermittent heel pain, particularly when walking or walking barefoot. She has not made any recent changes to her footwear or use of inserts. She will be provided with instructions for managing plantar fasciitis, including gentle stretching exercises, massaging the heel, and alternating hot and cold treatments. The use of firm heel cups for even pressure distribution is recommended. Topical Voltaren gel, an over-the-counter anti-inflammatory, can be applied to a nickel-sized area three times daily if the pain persists.    4. Hair loss.  She has been taking finasteride for hair loss, which was

## 2025-01-13 NOTE — ASSESSMENT & PLAN NOTE
Monitored by specialist- no acute findings meriting change in the plan  Afib: Doing well since last visit without chest pain or reported angina symptoms or CHF, palpitations, edema, presyncope or syncope.  Vitals controlled and tolerating meds well. Staying active without any significant limitations.   [id number] Score for Atrial Fibrillation Stroke Risk   Risk   Factors  Component Value   C CHF No 0   H HTN No 0   A2 Age >= 75 Yes,  (79 y.o.) 2   D DM No 0   S2 Prior Stroke/TIA No 0   V Vascular Disease No 0   A Age 65-74 No,  (79 y.o.) 0   Sc Sex female 1    [id number]  Score  3   Score last updated 1/13/25 9:18 AM EST    Click here for a link to the UpToDate guideline \"Atrial Fibrillation: Anticoagulation therapy to prevent embolization    Disclaimer: Risk Score calculation is dependent on accuracy of patient problem list and past encounter diagnosis.

## 2025-01-16 ENCOUNTER — TELEPHONE (OUTPATIENT)
Dept: INTERNAL MEDICINE CLINIC | Facility: CLINIC | Age: 80
End: 2025-01-16

## 2025-01-16 NOTE — TELEPHONE ENCOUNTER
Pt called to check to see about medication for synthroid sent. I confirmed with Pawel that medication was sent and called the pt back to let her know  Thank you,Renetta